# Patient Record
Sex: FEMALE
[De-identification: names, ages, dates, MRNs, and addresses within clinical notes are randomized per-mention and may not be internally consistent; named-entity substitution may affect disease eponyms.]

---

## 2018-06-08 ENCOUNTER — HOSPITAL ENCOUNTER (EMERGENCY)
Dept: HOSPITAL 97 - ER | Age: 83
LOS: 1 days | Discharge: HOME | End: 2018-06-09
Payer: MEDICAID

## 2018-06-08 DIAGNOSIS — I10: ICD-10-CM

## 2018-06-08 DIAGNOSIS — N39.0: ICD-10-CM

## 2018-06-08 DIAGNOSIS — E86.0: Primary | ICD-10-CM

## 2018-06-08 LAB
BUN BLD-MCNC: 34 MG/DL (ref 6–20)
GLUCOSE SERPLBLD-MCNC: 112 MG/DL (ref 65–120)
HCT VFR BLD CALC: 40.8 % (ref 36–45)
LYMPHOCYTES # SPEC AUTO: 2.4 K/UL (ref 0.7–4.9)
MCH RBC QN AUTO: 30.2 PG (ref 27–35)
MCV RBC: 90.3 FL (ref 80–100)
PMV BLD: 9.2 FL (ref 7.6–11.3)
POTASSIUM SERPL-SCNC: 4.2 MEQ/L (ref 3.6–5)
RBC # BLD: 4.52 M/UL (ref 3.86–4.86)

## 2018-06-08 PROCEDURE — 85025 COMPLETE CBC W/AUTO DIFF WBC: CPT

## 2018-06-08 PROCEDURE — 99284 EMERGENCY DEPT VISIT MOD MDM: CPT

## 2018-06-08 PROCEDURE — 84484 ASSAY OF TROPONIN QUANT: CPT

## 2018-06-08 PROCEDURE — 80048 BASIC METABOLIC PNL TOTAL CA: CPT

## 2018-06-08 PROCEDURE — 96374 THER/PROPH/DIAG INJ IV PUSH: CPT

## 2018-06-08 PROCEDURE — 36415 COLL VENOUS BLD VENIPUNCTURE: CPT

## 2018-06-08 PROCEDURE — 96361 HYDRATE IV INFUSION ADD-ON: CPT

## 2018-06-08 PROCEDURE — 93005 ELECTROCARDIOGRAM TRACING: CPT

## 2018-06-08 PROCEDURE — 81003 URINALYSIS AUTO W/O SCOPE: CPT

## 2018-06-09 NOTE — EDPHYS
Physician Documentation                                                                           

 [facility]                                                                

Name: Niya Kumar                                                                             

Age: 88 yrs                                                                                       

Sex: Female                                                                                       

: 1929                                                                                   

MRN: [id number]                                                                                   

Arrival Date: 2018                                                                          

Time: 20:19                                                                                       

Account#: [id number]                                                                            

Bed 24                                                                                            

Private MD:                                                                                       

ED Physician Pierre Goetz                                                                         

HPI:                                                                                              

                                                                                             

21:28 This 88 yrs old  Female presents to ER via Wheelchair with complaints of Blood  rn  

      Pressure Problem.                                                                           

21:28 Reports daughter left her at home, normally doesn't do that, felt anxiety about being   rn  

      at home alone, felt dizzy and lightheaded along with palpitations and anxiety, checked      

      blood pressure, was high, called friend her told her to come to ER. Feels better now        

      that she says feels "safe and protected". No chest pain/syncope/headache/trauma. Takes      

      BP meds but splits her dosage bid instead of taking as prescribed full dose once daily.     

      Plan is to take her home with friend and ultimately live with one of her sons. . Onset:     

      The symptoms/episode began/occurred today. Severity of symptoms: At their worst the         

      symptoms were moderate in the emergency department the symptoms have improved. The          

      patient has experienced similar episodes in the past. The patient has not recently seen     

      a physician.                                                                                

                                                                                                  

Historical:                                                                                       

- Allergies:                                                                                      

20:32 No Known Allergies;                                                                     fc  

- Home Meds:                                                                                      

20:32 lisinopril 20 mg Oral tab 1 tab twice a day [Active]; hydrochlorothiazide 25 mg Oral    fc  

      tab 1 tab once daily [Active];                                                              

- PMHx:                                                                                           

20:32 Hypertension; edema;                                                                    fc  

- PSHx:                                                                                           

20:32 None;                                                                                   fc  

                                                                                                  

- Immunization history:: Last tetanus immunization: unknown.                                      

- Social history:: Smoking status: Patient/guardian denies using tobacco.                         

- Ebola Screening: : Patient negative for fever greater than or equal to 101.5 degrees            

  Fahrenheit, and additional compatible Ebola Virus Disease symptoms Patient denies               

  exposure to infectious person Patient denies travel to an Ebola-affected area in the            

  21 days before illness onset.                                                                   

- Family history:: not pertinent.                                                                 

- Hospitalizations: : No recent hospitalization is reported.                                      

                                                                                                  

                                                                                                  

ROS:                                                                                              

21:28 Constitutional: Negative for fever, chills, and weight loss, Eyes: Negative for injury, rn  

      pain, redness, and discharge, Neck: Negative for injury, pain, and swelling,                

      Cardiovascular: Negative for chest pain, and edema, Respiratory: Negative for shortness     

      of breath, cough, wheezing, and pleuritic chest pain, Abdomen/GI: Negative for              

      abdominal pain, nausea, vomiting, diarrhea, and constipation, Back: Negative for injury     

      and pain, MS/Extremity: Negative for injury and deformity, Skin: Negative for injury,       

      rash, and discoloration, Neuro: Negative for headache, weakness, numbness, tingling,        

      and seizure.                                                                                

                                                                                                  

Exam:                                                                                             

21:28 Constitutional:  This is a well developed, well nourished patient who is awake, alert,  rn  

      and in no acute distress. Head/Face:  Normocephalic, atraumatic. Eyes:  Pupils equal        

      round and reactive to light, extra-ocular motions intact.  Lids and lashes normal.          

      Conjunctiva and sclera are non-icteric and not injected.  Cornea within normal limits.      

      Periorbital areas with no swelling, redness, or edema. Neck:  Trachea midline, no           

      thyromegaly or masses palpated, and no cervical lymphadenopathy.  Supple, full range of     

      motion without nuchal rigidity, or vertebral point tenderness.  No Meningismus.             

      Cardiovascular:  Regular rate and rhythm with a normal S1 and S2.  No gallops, murmurs,     

      or rubs.  Normal PMI, no JVD.  No pulse deficits. Respiratory:  Lungs have equal breath     

      sounds bilaterally, clear to auscultation and percussion.  No rales, rhonchi or wheezes     

      noted.  No increased work of breathing, no retractions or nasal flaring. Abdomen/GI:        

      Soft, non-tender, with normal bowel sounds.  No distension or tympany.  No guarding or      

      rebound.  No evidence of tenderness throughout. Skin:  Warm, dry with normal turgor.        

      Normal color with no rashes, no lesions, and no evidence of cellulitis. MS/ Extremity:      

      Pulses equal, no cyanosis.  Neurovascular intact.  Full, normal range of motion.  Equal     

      circumference. Neuro:  Awake and alert, GCS 15, oriented to person, place, time, and        

      situation.  Cranial nerves II-XII grossly intact.  Motor strength 5/5 in all                

      extremities.  Sensory grossly intact.                                                       

                                                                                                  

Vital Signs:                                                                                      

20:32  / 58; Pulse 64; Resp 18; Temp 99.0(O); Pulse Ox 98% on R/A; Weight 102.51 kg     fc  

      (R); Height 5 ft. 2 in. (157.48 cm) (R); Pain 0/10;                                         

21:10  / 41; Pulse 53; Resp 18; Pulse Ox 100% ;                                         tl3 

21:29  / 41; Pulse 66; Resp 18; Pulse Ox 99% on R/A;                                    tl3 

23:02  / 41; Pulse 51; Resp 18; Pulse Ox 99% ;                                          tl3 

                                                                                             

00:45  / 68; Pulse 64; Resp 18; Pulse Ox 97% ;                                          tl3 

                                                                                             

20:32 Body Mass Index 41.34 (102.51 kg, 157.48 cm)                                            fc  

                                                                                                  

MDM:                                                                                              

                                                                                             

20:43 Patient medically screened.                                                             rn  

                                                                                             

00:14 Differential Diagnosis dehydration, UTI, hypertension, anxiety, depression. Data        rn  

      reviewed: vital signs, nurses notes, lab test result(s), EKG, and as a result, I will       

      discharge patient. Counseling: I had a detailed discussion with the patient and/or          

      guardian regarding: the historical points, exam findings, and any diagnostic results        

      supporting the discharge/admit diagnosis, lab results, the need for outpatient follow       

      up, to return to the emergency department if symptoms worsen or persist or if there are     

      any questions or concerns that arise at home. Response to treatment: the patient's          

      symptoms have mildly improved after treatment, and as a result, I will discharge            

      patient. Special discussion: I discussed with the patient/guardian in detail that at        

      this point there is no indication for admission to the hospital. It is understood,          

      however, that if the symptoms persist or worsen the patient needs to return immediately     

      for re-evaluation.                                                                          

                                                                                                  

                                                                                             

20:54 Order name: CBC with Diff                                                               rn  

                                                                                             

20:54 Order name: Basic Metabolic Panel                                                       rn  

                                                                                             

20:54 Order name: Troponin (emerg Dept Use Only)                                              rn  

                                                                                             

20:54 Order name: CBC with Automated Diff; Complete Time: 22:44                               EDMS

                                                                                             

20:54 Order name: Basic Metabolic Panel; Complete Time: 22:44                                 EDMS

                                                                                             

20:54 Order name: Troponin (Emerg Dept Use Only); Complete Time: 22:44                        EDMS

                                                                                             

20:54 Order name: IV Start; Complete Time: 23:00                                              rn  

                                                                                             

20:54 Order name: EKG - Nurse/Tech; Complete Time: 22:07                                      rn  

                                                                                             

20:54 Order name: Urine Dipstick-Ancillary (obtain specimen); Complete Time: 23:25            rn  

                                                                                             

23:28 Order name: Urine Dipstick--Ancillary (enter results)                                   rg2 

                                                                                                  

Administered Medications:                                                                         

                                                                                             

21:05 Drug: NS 0.9% 500 ml Route: IV; Rate: bolus; Site: right antecubital; Delivery: Primary tl3 

      tubing;                                                                                     

21:56 Follow up: IV Status: Completed infusion; IV Intake: 500ml                              tl3 

                                                                                             

00:44 Drug: Rocephin - (cefTRIAXone) 1 grams Route: IVPB; Infused Over: 5 mins; Site: right   tl3 

      antecubital;                                                                                

00:44 Follow up: IV Status: Completed infusion; IV Intake: 20ml                               tl3 

                                                                                                  

                                                                                                  

Disposition:                                                                                      

18 00:15 Discharged to Home. Impression: Anxiety disorder, unspecified, Dehydration,        

  Urinary tract infection, site not specified.                                                    

- Condition is Stable.                                                                            

- Discharge Instructions: Panic Attacks, Dehydration, Adult, Urinary Tract Infection.             

- Prescriptions for cefpodoxime 100 mg Oral Tablet - take 1 tablet by ORAL route every            

  12 hours for 10 days take with food; 20 tablet.                                                 

- Medication Reconciliation Form, Thank You Letter, Antibiotic Education, Prescription            

  Opioid Use form.                                                                                

- Follow up: Private Physician; When: As needed; Reason: Recheck today's complaints,              

  Re-evaluation by your physician.                                                                

- Problem is new.                                                                                 

- Symptoms have improved.                                                                         

                                                                                                  

                                                                                                  

                                                                                                  

Signatures:                                                                                       

Dispatcher [employer]                           EDEvon Hill, RN                   Pierre Suarez MD MD rn Lowrey, Tammy, RN RN   tl3                                                  

                                                                                                  

Corrections: (The following items were deleted from the chart)                                    

00:46 00:15 2018 00:15 Discharged to Home. Impression: Anxiety disorder, unspecified;   tl3 

      Dehydration; Urinary tract infection, site not specified. Condition is Stable. Forms        

      are Medication Reconciliation Form, Thank You Letter, Antibiotic Education,                 

      Prescription Opioid Use. Follow up: Private Physician; When: As needed; Reason: Recheck     

      today's complaints, Re-evaluation by your physician. Problem is new. Symptoms have          

      improved. rn                                                                                

                                                                                                  

**************************************************************************************************

## 2018-06-09 NOTE — EKG
Test Date:    2018-06-08               Test Time:    22:03:17

Technician:   TL                                     

                                                     

MEASUREMENT RESULTS:                                       

Intervals:                                           

Rate:         59                                     

TN:           184                                    

QRSD:         94                                     

QT:           404                                    

QTc:          399                                    

Axis:                                                

P:            74                                     

TN:           184                                    

QRS:          -10                                    

T:            70                                     

                                                     

INTERPRETIVE STATEMENTS:                                       

                                                     

Sinus bradycardia

Otherwise normal ECG

Compared to ECG 08/12/2008 22:53:46

Sinus rhythm no longer present

Atrial premature complex(es) no longer present

Left ventricular hypertrophy no longer present

Myocardial infarct finding no longer present



Electronically Signed On 06-09-18 07:54:54 CDT by Shane Hurd

## 2018-06-09 NOTE — ER
Nurse's Notes                                                                                     

 [facility]                                                                

Name: Niya Kumar                                                                             

Age: 88 yrs                                                                                       

Sex: Female                                                                                       

: 1929                                                                                   

MRN: [id number]                                                                                   

Arrival Date: 2018                                                                          

Time: 20:19                                                                                       

Account#: [id number]                                                                            

Bed 24                                                                                            

Private MD:                                                                                       

Diagnosis: Anxiety disorder, unspecified;Dehydration;Urinary tract infection, site not specified  

                                                                                                  

Presentation:                                                                                     

                                                                                             

20:22 Presenting complaint: Family friend states that pt lives with her daughter and was left   

      alone today due to other family emergency. Pt called friend to state that she felt          

      nervous, scared and her feeling were hurt. Pt has recently been asked to leave her          

      current living situation due to family issues. Transition of care: patient was not          

      received from another setting of care. Onset of symptoms was 2018. Risk            

      Assessment: Do you want to hurt yourself or someone else? Patient reports no desire to      

      harm self or others. Initial Sepsis Screen: Does the patient meet any 2 criteria? No.       

      Patient's initial sepsis screen is negative. Does the patient have a suspected source       

      of infection? No. Patient's initial sepsis screen is negative. Care prior to arrival:       

      None.                                                                                       

20:22 Method Of Arrival: Wheelchair                                                           fc  

20:22 Acuity: FRANTZ 3                                                                           fc  

                                                                                                  

Historical:                                                                                       

- Allergies:                                                                                      

20:32 No Known Allergies;                                                                     fc  

- Home Meds:                                                                                      

20:32 lisinopril 20 mg Oral tab 1 tab twice a day [Active]; hydrochlorothiazide 25 mg Oral    fc  

      tab 1 tab once daily [Active];                                                              

- PMHx:                                                                                           

20:32 Hypertension; edema;                                                                    fc  

- PSHx:                                                                                           

20:32 None;                                                                                   fc  

                                                                                                  

- Immunization history:: Last tetanus immunization: unknown.                                      

- Social history:: Smoking status: Patient/guardian denies using tobacco.                         

- Ebola Screening: : Patient negative for fever greater than or equal to 101.5 degrees            

  Fahrenheit, and additional compatible Ebola Virus Disease symptoms Patient denies               

  exposure to infectious person Patient denies travel to an Ebola-affected area in the            

  21 days before illness onset.                                                                   

- Family history:: not pertinent.                                                                 

- Hospitalizations: : No recent hospitalization is reported.                                      

                                                                                                  

                                                                                                  

Screenin:10 Abuse screen: Denies threats or abuse. Nutritional screening: No deficits noted.        tl3 

      Tuberculosis screening: No symptoms or risk factors identified. Fall Risk None              

      identified.                                                                                 

                                                                                                  

Assessment:                                                                                       

21:10 General: Appears in no apparent distress. comfortable, obese, well groomed, well        tl3 

      developed, well nourished, Behavior is calm, cooperative, appropriate for age. Pain:        

      Denies pain. Neuro: Level of Consciousness is awake, alert, obeys commands, Oriented to     

      person, place, time, situation, Appropriate for age.                                        

21:29 Cardiovascular: Heart tones S1 S2 present Patient's skin is warm and dry.               tl3 

      Cardiovascular: Reports elevated blood pressure. Respiratory: Airway is patent              

      Respiratory effort is even, unlabored, Respiratory pattern is regular, symmetrical,         

      Breath sounds are clear bilaterally. GI: No signs and/or symptoms were reported             

      involving the gastrointestinal system. : No signs and/or symptoms were reported           

      regarding the genitourinary system. EENT: No signs and/or symptoms were reported            

      regarding the EENT system. Derm: No signs and/or symptoms reported regarding the            

      dermatologic system. Musculoskeletal: No signs and/or symptoms reported regarding the       

      musculoskeletal system.                                                                     

23:02 Reassessment: No changes from previously documented assessment. Patient and/or family   tl3 

      updated on plan of care and expected duration. Pain level reassessed. Patient is alert,     

      oriented x 3, equal unlabored respirations, skin warm/dry/pink. family at bedside.          

                                                                                             

00:45 Reassessment: Patient appears in no apparent distress at this time. No changes from     tl3 

      previously documented assessment. Patient and/or family updated on plan of care and         

      expected duration. Pain level reassessed. Patient is alert, oriented x 3, equal             

      unlabored respirations, skin warm/dry/pink.                                                 

                                                                                                  

Vital Signs:                                                                                      

                                                                                             

20:32  / 58; Pulse 64; Resp 18; Temp 99.0(O); Pulse Ox 98% on R/A; Weight 102.51 kg     fc  

      (R); Height 5 ft. 2 in. (157.48 cm) (R); Pain 0/10;                                         

21:10  / 41; Pulse 53; Resp 18; Pulse Ox 100% ;                                         tl3 

21:29  / 41; Pulse 66; Resp 18; Pulse Ox 99% on R/A;                                    tl3 

23:02  / 41; Pulse 51; Resp 18; Pulse Ox 99% ;                                          tl3 

                                                                                             

00:45  / 68; Pulse 64; Resp 18; Pulse Ox 97% ;                                          tl3 

                                                                                             

20:32 Body Mass Index 41.34 (102.51 kg, 157.48 cm)                                              

                                                                                                  

ED Course:                                                                                        

                                                                                             

20:19 Patient arrived in ED.                                                                  am2 

20:31 Triage completed.                                                                         

20:32 Arm band placed on Patient placed in an exam room, on a stretcher.                      fc  

20:43 Pierre Goetz MD is Attending Physician.                                                rn  

20:43 Dorinda Whelan RN is Primary Nurse.                                                     tl3 

21:10 Patient has correct armband on for positive identification. Placed in gown. Bed in low  tl3 

      position. Call light in reach. Side rails up X 1. Pulse ox on. NIBP on.                     

21:10 No provider procedures requiring assistance completed. Inserted saline lock: 22 gauge   tl3 

      in right antecubital area, using aseptic technique. Blood collected.                        

23:25 CBC with Diff Sent.                                                                     tl3 

23:25 Basic Metabolic Panel Sent.                                                             tl3 

23:25 Troponin (emerg Dept Use Only) Sent.                                                    tl3 

                                                                                             

00:46 IV discontinued, intact, bleeding controlled, No redness/swelling at site. Pressure     tl3 

      dressing applied.                                                                           

                                                                                                  

Administered Medications:                                                                         

                                                                                             

21:05 Drug: NS 0.9% 500 ml Route: IV; Rate: bolus; Site: right antecubital; Delivery: Primary tl3 

      tubing;                                                                                     

21:56 Follow up: IV Status: Completed infusion; IV Intake: 500ml                              tl3 

                                                                                             

00:44 Drug: Rocephin - (cefTRIAXone) 1 grams Route: IVPB; Infused Over: 5 mins; Site: right   tl3 

      antecubital;                                                                                

00:44 Follow up: IV Status: Completed infusion; IV Intake: 20ml                               tl3 

                                                                                                  

                                                                                                  

Intake:                                                                                           

                                                                                             

21:56 IV: 500ml; Total: 500ml.                                                                tl3 

                                                                                             

00:44 IV: 20ml; Total: 520ml.                                                                 tl3 

                                                                                                  

Outcome:                                                                                          

00:15 Discharge ordered by MD.                                                                rn  

00:46 Discharged to home via wheelchair.                                                      tl3 

00:46 Condition: stable                                                                           

00:46 Discharge instructions given to patient, Instructed on discharge instructions, follow       

      up and referral plans. medication usage, Demonstrated understanding of instructions,        

      follow-up care, medications, Prescriptions given X 1.                                       

00:46 Patient left the ED.                                                                    tl3 

                                                                                                  

Signatures:                                                                                       

Evon Houston RN RN                                                      

Pierre Goetz MD MD rn Moreno, Amanda                               2                                                  

Dorinda Whelan RN                       RN   tl3                                                  

                                                                                                  

**************************************************************************************************

## 2018-07-04 ENCOUNTER — HOSPITAL ENCOUNTER (OUTPATIENT)
Dept: HOSPITAL 97 - ER | Age: 83
Setting detail: OBSERVATION
LOS: 3 days | Discharge: HOME | End: 2018-07-07
Attending: HOSPITALIST | Admitting: HOSPITALIST
Payer: MEDICAID

## 2018-07-04 DIAGNOSIS — E87.1: Primary | ICD-10-CM

## 2018-07-04 DIAGNOSIS — E87.5: ICD-10-CM

## 2018-07-04 DIAGNOSIS — N28.9: ICD-10-CM

## 2018-07-04 DIAGNOSIS — M81.0: ICD-10-CM

## 2018-07-04 DIAGNOSIS — E86.0: ICD-10-CM

## 2018-07-04 DIAGNOSIS — E83.42: ICD-10-CM

## 2018-07-04 LAB
ALBUMIN SERPL BCP-MCNC: 3.2 G/DL (ref 3.4–5)
ALP SERPL-CCNC: 92 U/L (ref 45–117)
ALT SERPL W P-5'-P-CCNC: 23 U/L (ref 12–78)
AST SERPL W P-5'-P-CCNC: 23 U/L (ref 15–37)
BUN BLD-MCNC: 29 MG/DL (ref 7–18)
CKMB CREATINE KINASE MB: 2 NG/ML (ref 0.3–3.6)
GLUCOSE SERPLBLD-MCNC: 118 MG/DL (ref 74–106)
HCT VFR BLD CALC: 36.2 % (ref 36–45)
INR BLD: 0.96
LYMPHOCYTES # SPEC AUTO: 1.7 K/UL (ref 0.7–4.9)
MAGNESIUM SERPL-MCNC: 1.6 MG/DL (ref 1.8–2.4)
MCH RBC QN AUTO: 30.6 PG (ref 27–35)
MCV RBC: 89.9 FL (ref 80–100)
NT-PROBNP SERPL-MCNC: 519 PG/ML (ref ?–450)
PMV BLD: 8.5 FL (ref 7.6–11.3)
POTASSIUM SERPL-SCNC: 4.8 MMOL/L (ref 3.5–5.1)
RBC # BLD: 4.03 M/UL (ref 3.86–4.86)

## 2018-07-04 PROCEDURE — 99285 EMERGENCY DEPT VISIT HI MDM: CPT

## 2018-07-04 PROCEDURE — 93005 ELECTROCARDIOGRAM TRACING: CPT

## 2018-07-04 PROCEDURE — 80053 COMPREHEN METABOLIC PANEL: CPT

## 2018-07-04 PROCEDURE — 80076 HEPATIC FUNCTION PANEL: CPT

## 2018-07-04 PROCEDURE — 81003 URINALYSIS AUTO W/O SCOPE: CPT

## 2018-07-04 PROCEDURE — 81015 MICROSCOPIC EXAM OF URINE: CPT

## 2018-07-04 PROCEDURE — 84439 ASSAY OF FREE THYROXINE: CPT

## 2018-07-04 PROCEDURE — 84443 ASSAY THYROID STIM HORMONE: CPT

## 2018-07-04 PROCEDURE — 96361 HYDRATE IV INFUSION ADD-ON: CPT

## 2018-07-04 PROCEDURE — 84300 ASSAY OF URINE SODIUM: CPT

## 2018-07-04 PROCEDURE — 83930 ASSAY OF BLOOD OSMOLALITY: CPT

## 2018-07-04 PROCEDURE — 84100 ASSAY OF PHOSPHORUS: CPT

## 2018-07-04 PROCEDURE — 83935 ASSAY OF URINE OSMOLALITY: CPT

## 2018-07-04 PROCEDURE — 82570 ASSAY OF URINE CREATININE: CPT

## 2018-07-04 PROCEDURE — 82550 ASSAY OF CK (CPK): CPT

## 2018-07-04 PROCEDURE — 84484 ASSAY OF TROPONIN QUANT: CPT

## 2018-07-04 PROCEDURE — 96374 THER/PROPH/DIAG INJ IV PUSH: CPT

## 2018-07-04 PROCEDURE — 71045 X-RAY EXAM CHEST 1 VIEW: CPT

## 2018-07-04 PROCEDURE — 96375 TX/PRO/DX INJ NEW DRUG ADDON: CPT

## 2018-07-04 PROCEDURE — 76770 US EXAM ABDO BACK WALL COMP: CPT

## 2018-07-04 PROCEDURE — 93306 TTE W/DOPPLER COMPLETE: CPT

## 2018-07-04 PROCEDURE — 85610 PROTHROMBIN TIME: CPT

## 2018-07-04 PROCEDURE — 85730 THROMBOPLASTIN TIME PARTIAL: CPT

## 2018-07-04 PROCEDURE — 87088 URINE BACTERIA CULTURE: CPT

## 2018-07-04 PROCEDURE — 82553 CREATINE MB FRACTION: CPT

## 2018-07-04 PROCEDURE — 83880 ASSAY OF NATRIURETIC PEPTIDE: CPT

## 2018-07-04 PROCEDURE — 82533 TOTAL CORTISOL: CPT

## 2018-07-04 PROCEDURE — 96365 THER/PROPH/DIAG IV INF INIT: CPT

## 2018-07-04 PROCEDURE — 87086 URINE CULTURE/COLONY COUNT: CPT

## 2018-07-04 PROCEDURE — 83735 ASSAY OF MAGNESIUM: CPT

## 2018-07-04 PROCEDURE — 74176 CT ABD & PELVIS W/O CONTRAST: CPT

## 2018-07-04 PROCEDURE — 36415 COLL VENOUS BLD VENIPUNCTURE: CPT

## 2018-07-04 PROCEDURE — 80048 BASIC METABOLIC PNL TOTAL CA: CPT

## 2018-07-04 PROCEDURE — 85025 COMPLETE CBC W/AUTO DIFF WBC: CPT

## 2018-07-05 LAB
ALBUMIN SERPL BCP-MCNC: 2.8 G/DL (ref 3.4–5)
ALP SERPL-CCNC: 90 U/L (ref 45–117)
ALT SERPL W P-5'-P-CCNC: 20 U/L (ref 12–78)
AST SERPL W P-5'-P-CCNC: 24 U/L (ref 15–37)
BUN BLD-MCNC: 24 MG/DL (ref 7–18)
BUN BLD-MCNC: 25 MG/DL (ref 7–18)
BUN BLD-MCNC: 26 MG/DL (ref 7–18)
CREAT UR-SCNC: 30 MG/DL (ref 20–320)
GLUCOSE SERPLBLD-MCNC: 116 MG/DL (ref 74–106)
GLUCOSE SERPLBLD-MCNC: 116 MG/DL (ref 74–106)
GLUCOSE SERPLBLD-MCNC: 125 MG/DL (ref 74–106)
HCT VFR BLD CALC: 33.6 % (ref 36–45)
LYMPHOCYTES # SPEC AUTO: 1.6 K/UL (ref 0.7–4.9)
MAGNESIUM SERPL-MCNC: 1.7 MG/DL (ref 1.8–2.4)
MCH RBC QN AUTO: 30.6 PG (ref 27–35)
MCV RBC: 89.1 FL (ref 80–100)
PMV BLD: 8.9 FL (ref 7.6–11.3)
POTASSIUM SERPL-SCNC: 4.7 MMOL/L (ref 3.5–5.1)
POTASSIUM SERPL-SCNC: 5.1 MMOL/L (ref 3.5–5.1)
POTASSIUM SERPL-SCNC: 5.8 MMOL/L (ref 3.5–5.1)
RBC # BLD: 3.77 M/UL (ref 3.86–4.86)
SODIUM UR-SCNC: 70 MMOL/L (ref 27–287)
TSH SERPL DL<=0.05 MIU/L-ACNC: 1.28 UIU/ML (ref 0.36–3.74)
UA COMPLETE W REFLEX CULTURE PNL UR: (no result)
UA DIPSTICK W REFLEX MICRO PNL UR: (no result)

## 2018-07-05 RX ADMIN — SODIUM CHLORIDE SCH MLS: 0.9 INJECTION, SOLUTION INTRAVENOUS at 03:19

## 2018-07-05 RX ADMIN — AMLODIPINE BESYLATE SCH MG: 5 TABLET ORAL at 13:05

## 2018-07-05 RX ADMIN — SODIUM CHLORIDE SCH MLS: 0.9 INJECTION, SOLUTION INTRAVENOUS at 18:06

## 2018-07-05 RX ADMIN — Medication SCH: at 21:00

## 2018-07-05 RX ADMIN — DEXTROSE MONOHYDRATE SCH MLS: 50 INJECTION, SOLUTION INTRAVENOUS at 22:10

## 2018-07-05 RX ADMIN — Medication SCH ML: at 10:24

## 2018-07-05 NOTE — P.PN
Subjective


Date of Service: 07/05/18


Primary Care Provider: None


Chief Complaint: Hyponatremia/AMS


Subjective: Improving (Patient feeling better.)





Physical Examination





- Vital Signs


Temperature: 96.9 F


Blood Pressure: 163/72


Pulse: 50


Respirations: 18


Pulse Ox (%): 96





- Physical Exam


General: Alert, In no apparent distress, Oriented x3, Cooperative


HEENT: Atraumatic, Other (Dry mucous membranes)


Neck: Supple


Respiratory: Clear to auscultation bilaterally, Normal air movement


Cardiovascular: Normal pulses, Regular rate/rhythm


Gastrointestinal: Normal bowel sounds, Soft and benign, Non-distended, No 

tenderness, No masses, No rebound, No guarding


Musculoskeletal: No erythema, No tenderness, No warmth


Integumentary: No tenderness/swelling, No erythema, No warmth, No cyanosis


Neurological: Normal speech, Normal strength at 5/5 x4 extr, Normal tone, 

Normal affect





- Studies


Laboratory Data (last 24 hrs)





07/04/18 23:10: PT 11.3, INR 0.96, APTT 26.1


07/04/18 23:10: WBC 9.9, Hgb 12.3, Hct 36.2, Plt Count 209


07/04/18 23:10: Sodium 123 L, Potassium 4.8, BUN 29 H, Creatinine 1.10, Glucose 

118 H, Magnesium 1.6 L, Total Bilirubin 0.5, AST 23, ALT 23, Alkaline 

Phosphatase 92





Medications List Reviewed: Yes





Assessment & Plan





- Problems (Diagnosis)


(1) Shortness of breath


Current Visit: Yes   Status: Acute   


Plan: 


Etiology unknown.  Chest x-ray unremarkable.  CT head unremarkable.  Will 

monitor closely.  Patient came in with hyponatremia likely from dehydration and 

medication-lisinopril hydrochlorothiazide, which has been discontinued.  Will 

consult Nephrology to further evaluate.  Patient with hypertension.  Will 

provide medication for blood pressure.  Will check echocardiogram.








(2) Hypertension


Current Visit: Yes   Status: Chronic   


Plan: 


Will provide Norvasc 5 mg daily.  Will monitor and adjust appropriately.  

Lisinopril/hydrochlorothiazide has been discontinued due to hyponatremia.


Qualifiers: 


   Hypertension type: essential hypertension   Qualified Code(s): I10 - 

Essential (primary) hypertension   





(3) Hyperkalemia


Current Visit: Yes   Status: Acute   


Plan: 


Improved.  Patient given Kayexalate.  Will monitor closely.








(4) Renal insufficiency


Current Visit: Yes   Status: Acute   


Plan: 


Likely from dehydration and medication.  Will hold lisinopril/

hydrochlorothiazide.  Will monitor renal function.  Will check renal 

ultrasound.  Will consult Nephrology to further assess and treat.








(5) Dehydration


Current Visit: Yes   Status: Acute   


Plan: 


Continue IV fluids.  Will monitor closely.








(6) Hyponatremia


Onset Date: 07/05/18   Current Visit: Yes   Status: Acute   


Plan: 


Continue IV fluids.  Will monitor closely.








(7) Osteoporosis


Current Visit: Yes   Status: Chronic   


Plan: 


Osteoporosis noted on hip x-ray.  No fracture noted.


Qualifiers: 


   Osteoporosis type: age-related   Presence of current pathological fracture: 

unspecified   Qualified Code(s): M81.0 - Age-related osteoporosis without 

current pathological fracture   





(8) Abnormal x-ray


Current Visit: Yes   Status: Chronic   


Plan: 


35 mm density to the right humerus likely infarct.  Will check x-ray.  Patient 

with osteoarthritis and osteoporosis.





Discharge Plan: Home


Plan to discharge in: 24 Hours





- Code Status/Comfort Care


Code Status Assessed: Yes (Patient full code.)


Time Spent Managing Pts Care (In Minutes): 55

## 2018-07-05 NOTE — RAD REPORT
EXAM DESCRIPTION:  RAD - Humerus Right - 7/5/2018 11:51 am

 

CLINICAL HISTORY:  Bone lesion, abnormal chest film

 

COMPARISON:  Chest exam July 4

 

FINDINGS:  No acute fracture is present. Mild degenerative spurring changes are present along the sup
erolateral humeral head. Soft tissue calcifications are present probably from the supraspinatus tendo
n. There degenerative changes to the undersurface of the acromion. No AC joint spurring.

 

In the proximal humerus an approximately 5 x 2 centimeter area of focal density is present. This matc
hes the chest film finding. This is centrally located in the no metaphyseal and greater tuberosity re
gion of the humerus and generally showing infarct and ringing configuration. No endosteal scalloping 
or cortical change. No periosteal reaction. This has a typical appearance for either a bone infarct o
r enchondroma, neither considered clinically significant. There is no dislocation or periosteal react
ion noted. No foreign body or other soft tissue abnormality.

 

IMPRESSION:  Proximal right humerus sclerotic lesion is a bone infarct or enchondroma. In the absence
 of any bone pain symptoms, this is not a clinically significant finding.

 

The patient does have degenerative change at the right shoulder joint unrelated to the bone lesion.

## 2018-07-05 NOTE — ER
Nurse's Notes                                                                                     

 [facility]                                                                

Name: Niya Kumar                                                                             

Age: 88 yrs                                                                                       

Sex: Female                                                                                       

: 1929                                                                                   

MRN: [id number]                                                                                   

Arrival Date: 2018                                                                          

Time: 22:46                                                                                       

Account#: [id number]                                                                            

Bed 6                                                                                             

Private MD:                                                                                       

Diagnosis: Hyponatremia;Hypertensive heart disease                                                

                                                                                                  

Presentation:                                                                                     

                                                                                             

22:54 Presenting complaint: Patient states: I FEEL LIKE I CAN'T BREATHE. Transition of care:  rv  

      patient was not received from another setting of care. Onset of symptoms was 2018 at 19:00. Risk Assessment: Do you want to hurt yourself or someone else? Patient       

      reports no desire to harm self or others. Initial Sepsis Screen: Does the patient meet      

      any 2 criteria? No. Patient's initial sepsis screen is negative. Does the patient have      

      a suspected source of infection? No. Patient's initial sepsis screen is negative. Note      

      PT STATES SHE HAS BEEN DRINKING LOTS OF WATER FOR HER PRIOR UTI, BUT HAS NOT HAD MUCH       

      URINE OUTPUT. Care prior to arrival: None.                                                  

22:54 Method Of Arrival: Wheelchair                                                           rv  

22:54 Acuity: FRANTZ 3                                                                           rv  

                                                                                                  

Triage Assessment:                                                                                

22:56 General: Appears in no apparent distress. comfortable, obese, Behavior is cooperative,  rv  

      appropriate for age, anxious. Pain: Denies pain. Respiratory: Reports shortness of          

      breath Airway is patent Respiratory effort is even, unlabored, Respiratory pattern is       

      regular, symmetrical, Onset: The symptoms/episode began/occurred today, the patient has     

      mild shortness of breath.                                                                   

                                                                                                  

Historical:                                                                                       

- Allergies:                                                                                      

22:56 No Known Allergies;                                                                     rv  

- Home Meds:                                                                                      

22:56 hydrochlorothiazide 25 mg Oral tab 1 tab once daily [Active]; lisinopril 20 mg Oral tab rv  

      1 tab twice a day [Active];                                                                 

- PMHx:                                                                                           

22:56 Hypertension; EDEMA; Anxiety;                                                           rv  

                                                                                                  

- Immunization history:: Adult Immunizations up to date.                                          

- Social history:: Smoking status: Patient/guardian denies using tobacco.                         

- Ebola Screening: : Patient negative for fever greater than or equal to 101.5 degrees            

  Fahrenheit, and additional compatible Ebola Virus Disease symptoms Patient denies               

  exposure to infectious person Patient denies travel to an Ebola-affected area in the            

  21 days before illness onset No symptoms or risks identified at this time.                      

                                                                                                  

                                                                                                  

Screenin:58 Abuse screen: Denies threats or abuse. Denies injuries from another. Nutritional        rv  

      screening: No deficits noted. Tuberculosis screening: No symptoms or risk factors           

      identified. Fall Risk None identified.                                                      

                                                                                                  

Assessment:                                                                                       

22:58 General: SEE TRIAGE NOTE. Cardiovascular: Rhythm is sinus bradycardia. Respiratory:     rv  

      Airway is patent Respiratory effort is even, unlabored, Respiratory pattern is regular,     

      symmetrical, Breath sounds are clear.                                                       

23:50 Reassessment: Patient appears in no apparent distress at this time. Patient and/or      rv  

      family updated on plan of care and expected duration. Pain level reassessed. Patient is     

      alert, oriented x 3, equal unlabored respirations, skin warm/dry/pink. patient lying on     

      bed, comfortable and vital signs are stable.                                                

                                                                                             

00:26 Reassessment: Patient complaint of nausea at this time, Provider notified; Verbal order lp1 

      given to administer Zofran 4mg IV.                                                          

01:25 Reassessment: ALL CURRENT ORDERS COMPLETED, ADMIT IN PROCESS PENDING ADMIT ORDERS.      bp  

                                                                                                  

Vital Signs:                                                                                      

                                                                                             

22:56  / 57; Pulse 57; Resp 16; Temp 98.2; Pulse Ox 100% ; Weight 102.51 kg;            rv  

                                                                                             

00:00  / 51; Pulse 55; Resp 17; Pulse Ox 99% ;                                          bp  

01:00  / 56; Pulse 52; Resp 17; Pulse Ox 98% ;                                          bp  

01:40  / 55; Pulse 50; Resp 15; Pulse Ox 99% on R/A;                                    rv  

                                                                                                  

ED Course:                                                                                        

                                                                                             

22:46 Patient arrived in ED.                                                                  ds1 

22:55 Elton Vital PA is PHCP.                                                                cp  

22:55 Arya Wheeler MD is Attending Physician.                                              cp  

22:55 Triage completed.                                                                       rv  

22:56 Arm band placed on.                                                                     rv  

22:58 Patient has correct armband on for positive identification. Placed in gown. Bed in low  rv  

      position. Call light in reach. Side rails up X2. Adult w/ patient.                          

23:04 Darren Maza, CELSA is Primary Nurse.                                                    bp  

23:13 Inserted saline lock: 20 gauge in right antecubital area, using aseptic technique.      lp1 

      Blood collected.                                                                            

                                                                                             

00:10 X-ray completed. Portable x-ray completed in exam room. Patient tolerated procedure     mh1 

      well.                                                                                       

00:11 XRAY Chest (1 view) In Process Unspecified.                                             EDMS

00:35 Katey Chambers MD is Hospitalizing Provider.                                           cp  

01:25 No provider procedures requiring assistance completed. Patient admitted, IV remains in  bp  

      place.                                                                                      

                                                                                                  

Administered Medications:                                                                         

                                                                                             

23:24 Drug: NS 0.9% 250 ml Route: IV; Rate: bolus; Site: right antecubital;                   rv  

23:49 Follow up: Response: No adverse reaction; IV Status: Completed infusion                 rv  

23:49 Drug: Aspirin Chewable Tablet 324 mg Route: PO;                                         rv  

                                                                                             

00:01 Follow up: Response: No adverse reaction                                                rv  

                                                                                             

23:49 Drug: cloNIDine 0.1 mg Route: PO;                                                       rv  

                                                                                             

00:02 Follow up: Response: No adverse reaction                                                rv  

                                                                                             

23:49 Drug: Ativan 0.5 mg Route: IVP; Site: right antecubital;                                rv  

                                                                                             

00:01 Follow up: Response: No adverse reaction                                                rv  

00:01 Drug: Magnesium Sulfate 1 grams Route: IVPB; Infused Over: 1 hrs; Site: right           rv  

      antecubital;                                                                                

00:27 Drug: Zofran 4 mg Route: IVP; Site: right antecubital;                                  lp1 

                                                                                                  

                                                                                                  

Outcome:                                                                                          

00:37 Decision to Hospitalize by Provider.                                                    cp  

02:10 Admitted to Med/surg accompanied by tech, via wheelchair, room 219, with chart, Report  rv  

      called to  [facility]                                                                          

02:10 Condition: good                                                                             

02:10 Instructed on the need for admit.                                                           

02:11 Patient left the ED.                                                                    rv  

                                                                                                  

Signatures:                                                                                       

Dispatcher [employer]                           EDMS                                                 

Gabby Hughes                               mh1                                                  

Geno Brown                                ds1                                                  

Perla Kim RN                         RN   lp1                                                  

Elton Vital PA                         PA   cp                                                   

Darren Maza RN RN   bp                                                   

Mir Penny RN                    RN   rv                                                   

                                                                                                  

**************************************************************************************************

## 2018-07-05 NOTE — ECHO
HEIGHT: 5 ft 0 in   WEIGHT: 224 lb 3.2 oz   DATE OF STUDY: 07/05/2018   REFER DR: Katey Chambers MD

2-DIMENSIONAL: BOLA WALKERMODE: YES

 DOPPLER: YES

COLOR FLOW: YES



                    TDS:  

PORTABLE: 

 DEFINITY:  

BUBBLE STUDY: 





DIAGNOSIS:  UNCONTROLLED HYPERTENSION



CARDIAC HISTORY:  

CATHERIZATION: NO

SURGERY: NO

PROSTHETIC VALVE: NO

PACEMAKER: NO





MEASUREMENTS (cm)

    DIASTOLIC (NORMALS)                 SYSTOLIC (NORMALS)

IVSd                 0.9 (0.6-1.2)                    LA Diam 3.2 (1.9-4.0)     LVEF       
  55%  

LVIDd               4.4 (3.5-5.7)                        LVIDs      3.1 (2.0-3.5)     %FS  
        29%

LVPWd             1.1 (0.6-1.2)

Ao Diam           2.8 (2.0-3.7)



2 DIMENSIONAL ASSESSMENT:

RIGHT ATRIUM:                   NORMAL

LEFT ATRIUM:       NORMAL



RIGHT VENTRICLE:            NORMAL

LEFT VENTRICLE: NORMAL



TRICUSPID VALVE:             NORMAL

MITRAL VALVE:     NORMAL



PULMONIC VALVE:             NORMAL

AORTIC VALVE:     NORMAL



PERICARDIAL EFFUSION: NONE

AORTIC ROOT:      NORMAL





LEFT VENTRICULAR WALL MOTION:     NORMAL



DOPPLER/COLOR FLOW:     NORMAL



COMMENTS:      NORMAL TWO DIMNESIONAL ECHOCARDIOGRAM WITH DOPPLER



TECHNOLOGIST:   BHARATH MOORE

## 2018-07-05 NOTE — EDPHYS
Physician Documentation                                                                           

 [facility]                                                                

Name: Niya Kumar                                                                             

Age: 88 yrs                                                                                       

Sex: Female                                                                                       

: 1929                                                                                   

MRN: [id number]                                                                                   

Arrival Date: 2018                                                                          

Time: 22:46                                                                                       

Account#: [id number]                                                                            

Bed 6                                                                                             

Private MD:                                                                                       

ED Physician Arya Wheeler                                                                       

HPI:                                                                                              

                                                                                             

23:30 This 88 yrs old  Female presents to ER via Wheelchair with complaints of        cp  

      Breathing Difficulty.                                                                       

23:30 The patient has shortness of breath tightness in neck. Onset: The symptoms/episode      cp  

      began/occurred tonight about .                                                          

23:30 Duration: The symptoms are continuous. Associated signs and symptoms: Pertinent         cp  

      negatives: chest pain, non-productive cough, productive cough, diaphoresis, fever,          

      vomiting, abdominal pain. Severity of symptoms: in the emergency department the             

      symptoms are unchanged.                                                                     

23:30 The patient has experienced a previous episode, last month, reports was diagnosed with  cp  

      anxiety.                                                                                    

                                                                                                  

Historical:                                                                                       

- Allergies:                                                                                      

22:56 No Known Allergies;                                                                     rv  

- Home Meds:                                                                                      

22:56 hydrochlorothiazide 25 mg Oral tab 1 tab once daily [Active]; lisinopril 20 mg Oral tab rv  

      1 tab twice a day [Active];                                                                 

- PMHx:                                                                                           

22:56 Hypertension; EDEMA; Anxiety;                                                           rv  

                                                                                                  

- Immunization history:: Adult Immunizations up to date.                                          

- Social history:: Smoking status: Patient/guardian denies using tobacco.                         

- Ebola Screening: : Patient negative for fever greater than or equal to 101.5 degrees            

  Fahrenheit, and additional compatible Ebola Virus Disease symptoms Patient denies               

  exposure to infectious person Patient denies travel to an Ebola-affected area in the            

  21 days before illness onset No symptoms or risks identified at this time.                      

                                                                                                  

                                                                                                  

ROS:                                                                                              

23:35 Cardiovascular: Negative for chest pain, edema, palpitations.                           cp  

23:35 Eyes: Negative for injury, pain, redness, and discharge.                                cp  

23:35 Constitutional: Negative for body aches, chills, fever, poor PO intake.                     

23:35 ENT: Positive for tightness in throat, Negative for drainage from ear(s), ear pain,         

      sore throat, difficulty swallowing, difficulty handling secretions, hoarseness.             

23:35 Respiratory: Negative for cough, wheezing.                                                  

23:35 Abdomen/GI: Negative for abdominal pain, nausea, vomiting, and diarrhea, constipation,      

      black/tarry stool, rectal bleeding.                                                         

23:35 Back: Negative for pain at rest, pain with movement, radiated pain.                         

23:35 : Negative for urinary symptoms.                                                          

23:35 MS/extremity: Negative for injury or acute deformity, decreased range of motion, pain,      

      paresthesias, swelling, tenderness, warmth.                                                 

23:35 Skin: Negative for cellulitis, rash.                                                        

23:35 Neuro: Negative for altered mental status, dizziness, headache, syncope, near syncope,      

      weakness.                                                                                   

23:35 All other systems are negative.                                                             

                                                                                                  

Exam:                                                                                             

23:20 ECG was reviewed by the Attending Physician.                                            cp  

23:42 Constitutional: The patient appears in no acute distress, alert, awake,                 cp  

      non-diaphoretic, non-toxic, well developed, well nourished.                                 

23:42 Head/Face:  Normocephalic, atraumatic. Eyes:  Pupils equal round and reactive to light, cp  

      extra-ocular motions intact.  Lids and lashes normal.  Conjunctiva and sclera are           

      non-icteric and not injected.  Cornea within normal limits.  Periorbital areas with no      

      swelling, redness, or edema. ENT:  Nares patent. No nasal discharge, no septal              

      abnormalities noted.  Tympanic membranes are normal and external auditory canals are        

      clear.  Oropharynx with no redness, swelling, or masses, exudates, or evidence of           

      obstruction, uvula midline.  Mucous membranes moist. Neck:  Trachea midline, no             

      thyromegaly or masses palpated, and no cervical lymphadenopathy.  Supple, full range of     

      motion without nuchal rigidity, or vertebral point tenderness.  No Meningismus.             

      Chest/axilla:  Normal chest wall appearance and motion.  Nontender with no deformity.       

      No lesions are appreciated.                                                                 

23:42 Cardiovascular: Rate: bradycardic, Rhythm: regular, Pulses: Pulses are 2+ in right          

      radial artery and left radial artery. Edema: is not appreciated, JVD: is not                

      appreciated.                                                                                

23:42 Respiratory: the patient does not display signs of respiratory distress,  Respirations:     

      normal, no use of accessory muscles, no retractions, no shallow respirations, no            

      splinting, no tachypnea, labored breathing, is not present, Breath sounds: are clear        

      throughout, no decreased breath sounds, no stridor, no wheezing.                            

23:42 Abdomen/GI: Inspection: obese Bowel sounds: active, all quadrants, Palpation: abdomen       

      is soft and non-tender, in all quadrants, rebound tenderness, is not appreciated,           

      voluntary guarding, is not appreciated, involuntary guarding, is not appreciated.           

23:42 Back: pain, is absent, ROM is normal.                                                       

23:42 Skin: cellulitis, is not appreciated, no rash present.                                      

23:42 Neuro: Orientation: to person, place \T\ time. Mentation: lucid, able to follow commands,   

      Cerebellar function: is grossly normal, Motor: moves all fours, strength is normal,         

      Sensation: is normal.                                                                       

                                                                                                  

Vital Signs:                                                                                      

22:56  / 57; Pulse 57; Resp 16; Temp 98.2; Pulse Ox 100% ; Weight 102.51 kg;            rv  

                                                                                             

00:00  / 51; Pulse 55; Resp 17; Pulse Ox 99% ;                                          bp  

01:00  / 56; Pulse 52; Resp 17; Pulse Ox 98% ;                                          bp  

01:40  / 55; Pulse 50; Resp 15; Pulse Ox 99% on R/A;                                    rv  

                                                                                                  

MDM:                                                                                              

                                                                                             

22:55 Patient medically screened.                                                             cp  

23:30 Differential diagnosis: Anxiety Reaction asthma, CHF exacerbation, Chronic Obstructive  cp  

      Pulmonary Disease Myocardial Infarction pneumonia, Pneumothorax pulmonary edema,            

      Pulmonary Embolism reactive airway disease, Unstable Angina.                                

                                                                                             

00:30 Counseling: I had a detailed discussion with the patient and/or guardian regarding: the cp  

      historical points, exam findings, and any diagnostic results supporting the                 

      discharge/admit diagnosis, the presence of at least one elevated blood pressure reading     

      (>120/80) during this emergency department visit, lab results, the need for further         

      work-up and treatment in the hospital.                                                      

00:30 Response to treatment: the patient's symptoms have mildly improved after treatment.       

      Physician consultation: Katey Chambers MD was called at 00:20, was contacted at 00:20,       

      regarding admission, to the telemetry unit. patient's condition.                            

00:32 Data reviewed: vital signs, nurses notes, lab test result(s), EKG, radiologic studies,  cp  

      plain films.                                                                                

                                                                                                  

                                                                                             

23:02 Order name: Basic Metabolic Panel; Complete Time: 23:53                                 cp  

                                                                                             

23:54 Interpretation: Normal except: ; CL 94; CO2 19; GLUC 118; BUN 29; GFR 47.         cp  

                                                                                             

23:02 Order name: CBC with Diff; Complete Time: 23:53                                         cp  

                                                                                             

23:54 Interpretation: Normal except: GOLD% 74.0.                                               cp  

07/04                                                                                             

23:02 Order name: Ckmb; Complete Time: 23:53                                                  cp  

07/04                                                                                             

23:02 Order name: CPK; Complete Time: 23:53                                                   cp  

07/04                                                                                             

23:02 Order name: LFT's; Complete Time: 23:53                                                 cp  

07/04                                                                                             

23:54 Interpretation: Normal except: ALB 3.2; A/G 0.9.                                        cp  

07/04                                                                                             

23:02 Order name: Magnesium; Complete Time: 23:53                                             cp  

07/                                                                                             

23:02 Order name: NT PRO-BNP; Complete Time: 23:53                                            cp  

07/04                                                                                             

23:02 Order name: PT-INR; Complete Time: 23:53                                                cp  

07/                                                                                             

23:02 Order name: Ptt, Activated; Complete Time: 23:53                                        cp  

/                                                                                             

23:02 Order name: Troponin (emerg Dept Use Only); Complete Time: 23:53                        cp  

/04                                                                                             

23:54 Interpretation: TROPED 0.04; Reviewed.                                                  cp  

07/                                                                                             

23:02 Order name: XRAY Chest (1 view)                                                         cp  

                                                                                             

23:02 Order name: EKG; Complete Time: 23:03                                                   cp  

07/04                                                                                             

23:02 Order name: Cardiac monitoring; Complete Time: 23:05                                    cp  

07/04                                                                                             

23:02 Order name: EKG - Nurse/Tech; Complete Time: 23:16                                      cp  

/                                                                                             

23:02 Order name: IV Saline Lock; Complete Time: 23:15                                        cp  

/                                                                                             

23:02 Order name: Labs collected and sent; Complete Time: 23:16                               cp  

/                                                                                             

23:02 Order name: O2 Per Protocol; Complete Time: 23:05                                       cp  

/                                                                                             

23:02 Order name: O2 Sat Monitoring; Complete Time: 23:05                                     cp  

                                                                                                  

EC/04                                                                                             

23:20 Rate is 52 beats/min. Rhythm is regular. ND interval is normal. QRS interval is normal. cp  

      QT interval is normal. T waves are Inverted in lead aVL. No ST changes noted.               

      Interpreted by me. Reviewed by me.                                                          

                                                                                                  

Administered Medications:                                                                         

23:24 Drug: NS 0.9% 250 ml Route: IV; Rate: bolus; Site: right antecubital;                   rv  

23:49 Follow up: Response: No adverse reaction; IV Status: Completed infusion                 rv  

23:49 Drug: Aspirin Chewable Tablet 324 mg Route: PO;                                         rv  

                                                                                             

00:01 Follow up: Response: No adverse reaction                                                rv  

                                                                                             

23:49 Drug: cloNIDine 0.1 mg Route: PO;                                                       rv  

                                                                                             

00:02 Follow up: Response: No adverse reaction                                                rv  

                                                                                             

23:49 Drug: Ativan 0.5 mg Route: IVP; Site: right antecubital;                                rv  

                                                                                             

00:01 Follow up: Response: No adverse reaction                                                rv  

00:01 Drug: Magnesium Sulfate 1 grams Route: IVPB; Infused Over: 1 hrs; Site: right           rv  

      antecubital;                                                                                

00:27 Drug: Zofran 4 mg Route: IVP; Site: right antecubital;                                  lp1 

                                                                                                  

                                                                                                  

Disposition:                                                                                      

03:45 Co-signature as Attending Physician, Arya Wheeler MD.                                    

                                                                                                  

Disposition:                                                                                      

18 00:37 Hospitalization ordered by Katey Chambers for Observation. Preliminary             

  diagnosis are Hyponatremia, Hypertensive heart disease.                                         

- Bed requested for Telemetry/MedSurg (observation).                                              

- Status is Observation.                                                                      rv  

- Condition is Stable.                                                                            

- Problem is new.                                                                                 

- Symptoms have improved.                                                                         

UTI on Admission? No                                                                              

                                                                                                  

                                                                                                  

                                                                                                  

Signatures:                                                                                       

Dispatcher [employer]                           EDMS                                                 

Gabby Granados RN                        RN                                                      

Perla Kim RN                         RN   lp1                                                  

Elton Vital PA PA cp Starr, Gregory, MD MD                                                      

Mir Penny RN                    RN   rv                                                   

                                                                                                  

Corrections: (The following items were deleted from the chart)                                    

00:43 00:37 Hospitalization Ordered by Katey Chambers MD for Observation. Preliminary          mw  

      diagnosis is Hyponatremia; Hypertensive heart disease. Bed requested for                    

      Telemetry/MedSurg (observation). Status is Observation. Condition is Stable. Problem is     

      new. Symptoms have improved. UTI on Admission? No. cp                                       

00:43 00:43 2018 00:37 Hospitalization Ordered by Katey Chambers MD for Observation.     mw  

      Preliminary diagnosis is Hyponatremia; Hypertensive heart disease. Bed requested for        

      Telemetry/MedSurg (observation). Status is Observation. Condition is Stable. Problem is     

      new. Symptoms have improved. UTI on Admission? No. mw                                       

02:11 00:43 2018 00:37 Hospitalization Ordered by Katey Chambers MD for Observation.     rv  

      Preliminary diagnosis is Hyponatremia; Hypertensive heart disease. Bed requested for        

      Telemetry/MedSurg (observation). Status is Observation. Condition is Stable. Problem is     

      new. Symptoms have improved. UTI on Admission? No. mw                                       

                                                                                                  

**************************************************************************************************

## 2018-07-05 NOTE — RAD REPORT
EXAM DESCRIPTION:  Landy Single View7/5/2018 12:11 am

 

CLINICAL HISTORY:  Chest pain

 

COMPARISON:  none

 

FINDINGS:   The lungs appear clear of acute infiltrate. The heart is mildly to moderately enlarged.

 

35 millimeter calcific density within the proximal right humerus probably represents an infarct

 

IMPRESSION:   No acute abnormalities displayed

## 2018-07-05 NOTE — P.HP
Certification for Inpatient


Patient admitted to: Observation


With expected LOS: <2 Midnights


Patient will require the following post-hospital care: None


Practitioner: I am a practitioner with admitting privileges, knowledge of 

patient current condition, hospital course, and medical plan of care.


Services: Services provided to patient in accordance with Admission 

requirements found in Title 42 Section 412.3 of the Code of Federal Regulations





Patient History


Date of Service: 07/05/18


Reason for admission: Hyponatremia/AMS


History of Present Illness: 





Patient is an 89yo who is mainly Croatian speaking, who was admitted to the 

hospital with shortness of breath and confusion.  Patient has been feeling weak 

with shortness of breath for the last few days.  Patient's symptoms were 

worsening and she fell like she could not breathe.  She apparently has been 

drinking a lot of fluids that she has been feeling thirsty all the time.  She 

also has felt somewhat confused.  She came into the hospital for further 

evaluation.  In the emergency room, her workup revealed significant 

hyponatremia.  She also had an elevated BNP.  She will be admitted to the 

hospital and worked up for her shortness of breath and her hyponatremia which 

is most likely related to her fluid intake as well as her newly started 

hydrochlorothiazide.  Will reassess her labs in the morning and proceed from 

there.


Allergies





No Known Allergies Allergy (Verified 07/05/18 02:40)


 





Home Medications: 








Lisinopril/Hydrochlorothiazide [Lisinopril-Hctz 20-12.5 mg Tab] 1 tab PO BID 07/ 05/18 








- Past Medical/Surgical History


Has patient received pneumonia vaccine in the past: No


Diabetic: No


-: HTN


Past Surgical History: Patient denies surgical history





- Family History


  ** Father


Family History: Reviewed- Non-Contributory





- Social History


Smoking Status: Never smoker


Alcohol use: No


CD- Drugs: No


Caffeine use: No


Place of Residence: Home





Review of Systems


10-point ROS is otherwise unremarkable





Physical Examination





- Vital Signs


Temperature: 97.2 F


Blood Pressure: 137/65


Pulse: 53


Respirations: 20


Pulse Ox (%): 98





- Physical Exam


General: Alert, In no apparent distress, Oriented x2


HEENT: Atraumatic, PERRLA, Mucous membr. moist/pink, EOMI, Sclerae nonicteric


Neck: Supple, 2+ carotid pulse no bruit, No LAD, Without JVD or thyroid 

abnormality


Respiratory: Clear to auscultation bilaterally, Normal air movement


Cardiovascular: Regular rate/rhythm, Normal S1 S2, No murmurs


Gastrointestinal: Normal bowel sounds, Soft and benign, Non-distended, No 

tenderness


Musculoskeletal: No clubbing, No swelling, No tenderness


Integumentary: No rashes


Neurological: Normal speech, Normal tone, Sensation intact, Cranial nerves 3-12 

intact, Normal affect, Abnormal gait, Abnormal strength


Lymphatics: No axilla or inguinal lymphadenopathy





- Studies


Laboratory Data (last 24 hrs)





07/04/18 23:10: PT 11.3, INR 0.96, APTT 26.1


07/04/18 23:10: WBC 9.9, Hgb 12.3, Hct 36.2, Plt Count 209


07/04/18 23:10: Sodium 123 L, Potassium 4.8, BUN 29 H, Creatinine 1.10, Glucose 

118 H, Magnesium 1.6 L, Total Bilirubin 0.5, AST 23, ALT 23, Alkaline 

Phosphatase 92








Assessment & Plan





- Problems (Diagnosis)


(1) Altered mental status


Current Visit: Yes   Status: Acute   





(2) Hyponatremia


Current Visit: Yes   Status: Acute   





(3) Left leg pain


Current Visit: Yes   Status: Acute   





(4) Dyspnea


Current Visit: Yes   Status: Acute   





- Plan





Plan:


1. Repeat sodium labs in the morning


2. Normal saline at 70 cc an hr


3. Echocardiogram


4. Dc hydrochlorothiazide


5. Left leg x-ray


6. Check thyroid studies and cortisol level


7. Wait for official chest x-ray reading


8. DVT prophylaxis


Discharge Plan: Home


Plan to discharge in: 48 Hours





- Advance Directives


Does patient have a Living Will: No


Does patient have a Durable POA for Healthcare: No





- Code Status/Comfort Care


Code Status Assessed: Yes


Code Status: Full Code


Critical Care: No


Time Spent Managing PTS Care (In Minutes): 50

## 2018-07-05 NOTE — RAD REPORT
EXAM DESCRIPTION:  RAD - Hip Left 2 View - 7/5/2018 9:23 am

 

CLINICAL HISTORY:   Left hip pain

 

FINDINGS:   No fracture or dislocation is seen.

 

The bones are osteoporotic. Mild osteoarthritis is seen. If patient has clinical symptoms to suggest 
an occult fracture then MRI would be recommend

## 2018-07-05 NOTE — CON
Date of Consultation:  07/05/2018



Chief Complaint:  Hyponatremia, hypoosmolar.



History Of Present Illness:  The patient came to the hospital because of 
shortness of breath and generalized weakness.  She was found to have moderately 
severe to severe hyponatremia.  Sodium level was 124 and glucose 125.  Primarily
, lab work was obtained on July 3rd in  and sodium level was 123, potassium 
4.8, chloride 94, CO2 19, BUN 29, creatinine 1.1, glucose 118.  Subsequently, 
osmolality was done on July 5th and serum osmolality was 260.  Sodium was 124, 
potassium was up to 5.8, chloride 95, CO2 22, BUN 26, creatinine 1.0, 
phosphorus 2.7, and calcium 8.4. 

 Nephrology consultation was requested today for hyponatremia.  Blood work 
obtained after hyperkalemia was treated with Kayexalate, lab work  showed 
sodium 124, potassium 5.1, chloride 96, CO2 23, calcium was 8.6.  The patient 
was started on normal saline for treatment of hyponatremia.  Review of home 
medications showed that the patient was taking HCTZ, which likely was a culprit 
for hyponatremia.  Renal ultrasound showed mild bilateral hydronephrosis, 
suspected increased renal echogenicity consistent with parenchymal disease and 
bilateral renal cysts.  

Prior to this admission, the patient was taking ACE inhibitor and she denies 
nonsteroidal anti-inflammatory medication.  Cardiac echo showed ejection 
fraction of 55%.  Normal echocardiogram with Doppler. 



The patient has history of hypertension.  The patient came to the hospital 
because she was complaining of difficulty with ambulation, shortness of breath, 
and some pressure like chest discomfort.  The patient was found to have 
moderately severe hyponatremia as well as hyperkalemia, elevated BNP.



Review of Systems:

Constitutional:  Denies fever or chills.  

Eyes:  Denies vision changes. 

Ears, Nose, Mouth, and Throat:  Denies sore throat or earache.  

Respiratory:  Complaining of shortness of breath and pressure like chest 
discomfort.  

GI:  Denies nausea or vomiting.  

:  Denies dysuria or hematuria. 

Musculoskeletal:  Denies muscle aches or joint swelling. 



All other system reviewed and all are negative.



Past Medical History:  Obesity, hypertension.



Family History:  No kidney disease.



Social History:  Denies tobacco, alcohol, or illicit drugs.



Physical Examination:

Vital Signs:  Blood pressure is 137/65, temperature 97.2, heart rate 53, 
respiratory rate 20, SpO2 98%.  

General:  The patient is not in acute distress 

Eyes:  Anicteric sclerae.  EOMI. 

Ear, Nose, Mouth, and Throat:  Oral mucosa moist.  No pallor. 

Neck:  Supple.  No JVD.  No bruits. 

Lungs:  Clear to auscultation bilaterally.  No wheezing.  No rhonchi 

Heart:  S1, S2.  No pericardial friction rub. 

Abdomen:  Soft, obese, nontender. 

Extremities:  No edema.  No clubbing.  No cyanosis.



Laboratory Data:  Sodium 123, potassium 4.8, BUN 29, creatinine 1.1, glucose 118
, magnesium 1.6.



Impression And Plan:  

1.   Hyponatremia, hypoosmolar, secondary to HCTZ.  The patient was taken off 
HCTZ, started on normal saline.  Monitor electrolytes.  Adjust fluid rate and 
chronicity for gradual correction of hyponatremia.

2.   Hyperkalemia.  The patient will need further workup to rule out 
hydronephrosis.  The patient will have a CT scan of the abdomen and pelvis 
without contrast per renal stone protocol.

3.   Continue low-potassium diet.  Continue normal saline for hydration.  
Adjust fluids as needed.

4.   Dyspnea.  Workup pending with Cardiology.  There is no evidence of 
systolic dysfunction.

5.   Hypertension.  The patient was taken off ACE inhibitor because of 

hyperkalemia.  Monitor electrolytes.  

6.   Hypomagnesemia.  Magnesium replacement as needed.





ALENA/ZECHARIAH

DD:  07/05/2018 20:23:05   Voice ID:  [id number]

DT:  07/05/2018 22:15:49   Report ID:  [id number]

MTDD

## 2018-07-05 NOTE — EKG
Test Date:    2018-07-04               Test Time:    23:15:19

Technician:                                       

                                                     

MEASUREMENT RESULTS:                                       

Intervals:                                           

Rate:         52                                     

MO:           164                                    

QRSD:         96                                     

QT:           410                                    

QTc:          381                                    

Axis:                                                

P:            55                                     

MO:           164                                    

QRS:          10                                     

T:            66                                     

                                                     

INTERPRETIVE STATEMENTS:                                       

                                                     

Sinus bradycardia

Otherwise normal ECG

Compared to ECG 06/08/2018 22:03:17

No significant changes



Electronically Signed On 07-05-18 06:29:04 CDT by Bruce Wood

## 2018-07-05 NOTE — RAD REPORT
EXAM DESCRIPTION:  US - Renal Ultrasound-Complete - 7/5/2018 12:08 pm

 

CLINICAL HISTORY:  . Renal

 

COMPARISON:  None.

 

FINDINGS:  The right kidney measures 11 centimeters cm with a mildly increased echotexture. Small rig
ht renal cysts are noted.

 

The left kidney measures cm with an increased echotexture. Several parapelvic and cortical renal cyst
s are seen measuring up to a 2.3 centimeters.

 

Mild bilateral hydronephrosis is suspected.

 

IMPRESSION:  Increased renal echotexture consistent with parenchymal disease

 

Bilateral renal cysts

 

Mild bilateral hydronephrosis is suspected

## 2018-07-06 LAB
ALBUMIN SERPL BCP-MCNC: 2.7 G/DL (ref 3.4–5)
ALP SERPL-CCNC: 84 U/L (ref 45–117)
ALT SERPL W P-5'-P-CCNC: 22 U/L (ref 12–78)
AST SERPL W P-5'-P-CCNC: 26 U/L (ref 15–37)
BUN BLD-MCNC: 21 MG/DL (ref 7–18)
BUN BLD-MCNC: 22 MG/DL (ref 7–18)
BUN BLD-MCNC: 23 MG/DL (ref 7–18)
BUN BLD-MCNC: 24 MG/DL (ref 7–18)
GLUCOSE SERPLBLD-MCNC: 104 MG/DL (ref 74–106)
GLUCOSE SERPLBLD-MCNC: 110 MG/DL (ref 74–106)
GLUCOSE SERPLBLD-MCNC: 145 MG/DL (ref 74–106)
GLUCOSE SERPLBLD-MCNC: 92 MG/DL (ref 74–106)
HCT VFR BLD CALC: 34.2 % (ref 36–45)
LYMPHOCYTES # SPEC AUTO: 1.3 K/UL (ref 0.7–4.9)
MAGNESIUM SERPL-MCNC: 1.8 MG/DL (ref 1.8–2.4)
MCH RBC QN AUTO: 31.7 PG (ref 27–35)
MCV RBC: 89.7 FL (ref 80–100)
PMV BLD: 9.8 FL (ref 7.6–11.3)
POTASSIUM SERPL-SCNC: 4.3 MMOL/L (ref 3.5–5.1)
POTASSIUM SERPL-SCNC: 4.5 MMOL/L (ref 3.5–5.1)
POTASSIUM SERPL-SCNC: 4.8 MMOL/L (ref 3.5–5.1)
POTASSIUM SERPL-SCNC: 4.9 MMOL/L (ref 3.5–5.1)
RBC # BLD: 3.81 M/UL (ref 3.86–4.86)

## 2018-07-06 RX ADMIN — Medication SCH: at 08:50

## 2018-07-06 RX ADMIN — DEXTROSE MONOHYDRATE SCH MLS: 50 INJECTION, SOLUTION INTRAVENOUS at 08:50

## 2018-07-06 RX ADMIN — DEXTROSE MONOHYDRATE SCH MLS: 50 INJECTION, SOLUTION INTRAVENOUS at 16:30

## 2018-07-06 RX ADMIN — Medication SCH ML: at 21:02

## 2018-07-06 RX ADMIN — AMLODIPINE BESYLATE SCH MG: 5 TABLET ORAL at 08:50

## 2018-07-06 NOTE — P.DS
Admission Date: 07/05/18


Discharge Date: 07/06/18


Primary Care Provider: [place] Ariane


Disposition: ROUTINE DISCHARGE


Discharge Condition: GOOD


Reason for Admission: Hyponatremia/AMS


Consultations: 





Nephrology-Dr. Arias


Procedures: 





Echocardiogram:  Ejection fraction 55% otherwise unremarkable.





Renal ultrasound:  Medical renal disease noted. Bilateral renal cysts noted. 

Mild bilateral hydronephrosis noted.





CT scan abdomen pelvis:





Humerus x-ray:  Proximal right humerus sclerotic lesion noted. Likely benign.  

Patient with degenerative changes to the right shoulder.





- Problems


(1) Shortness of breath


Current Visit: Yes   Status: Acute   





(2) Hypertension


Current Visit: Yes   Status: Chronic   


Qualifiers: 


   Hypertension type: essential hypertension   Qualified Code(s): I10 - 

Essential (primary) hypertension   





(3) Hyperkalemia


Current Visit: Yes   Status: Acute   





(4) Renal insufficiency


Current Visit: Yes   Status: Acute   





(5) Dehydration


Current Visit: Yes   Status: Acute   





(6) Hyponatremia


Onset Date: 07/05/18   Current Visit: Yes   Status: Acute   





(7) Osteoporosis


Current Visit: Yes   Status: Chronic   


Qualifiers: 


   Osteoporosis type: age-related   Presence of current pathological fracture: 

unspecified   Qualified Code(s): M81.0 - Age-related osteoporosis without 

current pathological fracture   





(8) Abnormal x-ray


Current Visit: Yes   Status: Chronic   





(9) Hydronephrosis


Current Visit: Yes   Status: Suspected   


Qualifiers: 


   Hydronephrosis type: unspecified   Qualified Code(s): N13.30 - Unspecified 

hydronephrosis   





(10) Chronic renal disease


Current Visit: Yes   Status: Chronic   


Qualifiers: 


   Chronic kidney disease stage: stage 2 (mild)   Qualified Code(s): N18.2 - 

Chronic kidney disease, stage 2 (mild)   





(11) Renal cyst


Current Visit: Yes   Status: Chronic   





(12) Degenerative arthritis of shoulder region


Current Visit: Yes   Status: Chronic   


Qualifiers: 


   Osteoarthritis type: unspecified   Laterality: right   Qualified Code(s): 

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   


Brief History of Present Illness: 





88-year-old  female presented emergency room with shortness of breath 

and fatigue.  Patient was found to be hyponatremic and hyperkalemic.  Patient 

on diuretic therapy-hydrochlorothiazide and ACE-inhibitor.  Patient was 

admitted for further evaluation.


Hospital Course: 





During the course of her stay her condition improved.  For her hyponatremia the 

patient was given IV fluids.  Nephrology was consulted.  Hyponatremia likely 

related to hydrochlorothiazide.  This has been discontinued along with ACE-

inhibitor.  For her hyperkalemia, patient given Kayexalate.  At discharge BMP 

within normal range.  Echocardiogram showed normal ejection fraction of 55%.  

Renal ultrasound showed parenchymal disease likely underlying chronic renal 

disease renal cysts noted. Mild hydronephrosis was noted. CT of the abdomen 

pelvis was done to further assess the hydronephrosis.  At discharge 

hydrochlorothiazide will be discontinued.  Patient will need to follow up with 

nephrology as an outpatient to further monitor and address.





Patient has hypertension.  Medications have been adjusted.  Patient will no 

longer take hydrochlorothiazide and lisinopril.  Patient will continue with 

medication-Norvasc 5 mg daily.  Recommendation is to maintain blood pressures 

less 150/80.  Further adjustment can be done by her PCP.





X-ray showed proximal right humerus sclerotic lesion.  This is likely benign.  

Degenerative changes noted to the right shoulder region as well.  

Recommendation for the patient follow up with orthopedics as outpatient to 

further address.  Patient may take Tylenol as needed for pain. Recommendation 

on no further use of nonsteroidal anti-inflammatories due to her chronic renal 

disease.





Patient has underlying chronic renal disease. Recommendations for the patient 

follow up with nephrology as an outpatient to further monitor.  Recommendation 

on no further use of nonsteroidal anti-inflammatories.  Future medications will 

need to be renally dosed.








Vital Signs/Physical Exam: 














Temp Pulse Resp BP Pulse Ox


 


 97.6 F   53   16   145/67 H  97 


 


 07/06/18 08:00  07/06/18 08:50  07/06/18 08:00  07/06/18 08:50  07/06/18 08:00








General: Alert, In no apparent distress, Oriented x3, Cooperative


HEENT: Atraumatic


Neck: Supple


Respiratory: Clear to auscultation bilaterally, Normal air movement


Cardiovascular: Normal pulses, Regular rate/rhythm


Gastrointestinal: Normal bowel sounds, Soft and benign, Non-distended, No 

tenderness, No masses, No rebound, No guarding


Musculoskeletal: No erythema, No tenderness, No warmth


Integumentary: No tenderness/swelling, No erythema, No warmth, No cyanosis


Neurological: Normal speech, Normal strength at 5/5 x4 extr, Normal tone, 

Normal affect


Laboratory Data at Discharge: 














WBC  7.0 K/uL (4.3-10.9)  D 07/06/18  04:09    


 


Hgb  12.1 g/dL (12.0-15.0)   07/06/18  04:09    


 


Hct  34.2 % (36.0-45.0)  L  07/06/18  04:09    


 


Plt Count  188 K/uL (152-406)   07/06/18  04:09    


 


PT  11.3 SECONDS (9.5-12.5)   07/04/18  23:10    


 


INR  0.96   07/04/18  23:10    


 


APTT  26.1 SECONDS (24.3-36.9)   07/04/18  23:10    


 


Sodium  133 mmol/L (136-145)  L  07/06/18  04:09    


 


Potassium  4.8 mmol/L (3.5-5.1)   07/06/18  04:09    


 


BUN  22 mg/dL (7-18)  H  07/06/18  04:09    


 


Creatinine  1.00 mg/dL (0.55-1.3)   07/06/18  04:09    


 


Glucose  104 mg/dL ()   07/06/18  04:09    


 


Phosphorus  2.7 mg/dL (2.5-4.9)   07/05/18  04:59    


 


Magnesium  1.8 mg/dL (1.8-2.4)   07/06/18  04:09    


 


Total Bilirubin  0.5 mg/dL (0.2-1.0)   07/06/18  04:09    


 


AST  26 U/L (15-37)   07/06/18  04:09    


 


ALT  22 U/L (12-78)   07/06/18  04:09    


 


Alkaline Phosphatase  84 U/L ()   07/06/18  04:09    








Home Medications: 








Amlodipine [Norvasc*] 5 mg PO DAILY #30 tab 07/06/18 


Aspirin [Aspirin EC 81 MG] 81 mg PO DAILY #30 tablet. 07/06/18 





New Medications: 


Amlodipine [Norvasc*] 5 mg PO DAILY #30 tab


Aspirin [Aspirin EC 81 MG] 81 mg PO DAILY #30 tablet.


Patient Discharge Instructions: 1.  Patient will need a follow up with a PCP in 

1 week to follow up this hospitalization.  2.  Patient presented with shortness 

of breath and fatigue.  Patient found to have hyponatremia and hyperkalemia.  

This is likely from medication.  Medications adjusted.  Patient seen by 

nephrology.  At discharge hydrochlorothiazide and lisinopril will be 

discontinued.  Patient will need to follow up with nephrology as an outpatient 

to further monitor and address.  Recommendation to recheck BMP in 1 week to 

monitor her progress.  Future medications will need to be renally dosed.  

Recommendation on no future use of nonsteroidal anti-inflammatory drugs.  3.  

Patient has hypertension.  Medications have been adjusted.  Patient will no 

longer take hydrochlorothiazide and lisinopril.  Patient will continue with 

medication-Norvasc 5 mg daily.  Recommendation is to maintain blood pressures 

less 150/80.  Further adjustment can be done by her PCP.  4.  X-ray showed 

proximal right humerus sclerotic lesion.  This is likely benign.  Degenerative 

changes noted to the right shoulder region as well.  Recommendation for the 

patient follow up with orthopedics as outpatient to further address.  Patient 

may take Tylenol as needed for pain. Recommendation on no further use of 

nonsteroidal anti-inflammatories due to her chronic renal disease.  5.  Patient 

has underlying chronic renal disease. Patient also found to have bilateral 

renal cysts.  Recommendations for the patient follow up with nephrology as an 

outpatient to further monitor.  Recommendation on no further use of 

nonsteroidal anti-inflammatories.  Future medications will need to be renally 

dosed.


Diet: Renal


Activity: Fall precautions


Time spent managing pt's care (in minutes): 55

## 2018-07-06 NOTE — P.PN
Subjective


Date of Service: 07/06/18


Primary Care Provider: [place] [facility]


Chief Complaint: Hyponatremia/AMS


Subjective: Improving





Physical Examination





- Vital Signs


Temperature: 97.6 F


Blood Pressure: 145/67


Pulse: 53


Respirations: 16


Pulse Ox (%): 97





- Physical Exam


General: Alert, In no apparent distress, Oriented x3


HEENT: Atraumatic


Neck: Supple


Respiratory: Clear to auscultation bilaterally, Normal air movement


Cardiovascular: Normal pulses, Regular rate/rhythm


Gastrointestinal: Normal bowel sounds, Soft and benign, Non-distended, No 

tenderness, No masses, No rebound, No guarding


Musculoskeletal: No warmth


Neurological: Normal speech, Normal strength at 5/5 x4 extr, Normal tone, 

Normal affect





- Studies


Medications List Reviewed: Yes





Assessment & Plan





- Problems (Diagnosis)


(1) Shortness of breath


Current Visit: Yes   Status: Acute   


Plan: 


Resolved.  Chest x-ray unremarkable.  CT head unremarkable.  Will monitor 

closely.  Patient came in with hyponatremia likely from dehydration and 

medication-lisinopril hydrochlorothiazide, which has been discontinued.  ECHO 

normal. CT scan shows parapelvic cysts. Nephrology wants to keep patient in one 

more day to monitor Sodium. Anticipate discharge tomorrow. 














(2) Hypertension


Current Visit: Yes   Status: Chronic   


Plan: 


Doing well with Norvasc 5 mg daily.  Will monitor and adjust appropriately.  

Lisinopril/hydrochlorothiazide has been discontinued due to hyponatremia.


Qualifiers: 


   Hypertension type: essential hypertension   Qualified Code(s): I10 - 

Essential (primary) hypertension   





(3) Hyperkalemia


Current Visit: Yes   Status: Acute   


Plan: 


Improved.  Patient given Kayexalate.  Will monitor closely.








(4) Renal insufficiency


Current Visit: Yes   Status: Acute   


Plan: 


Likely from dehydration and medication.  Will hold lisinopril/

hydrochlorothiazide.  Will monitor renal function.  Nephrology wants to monitor 

Na one more day. 








(5) Dehydration


Current Visit: Yes   Status: Acute   


Plan: 


Continue IV fluids.  Will monitor closely.








(6) Hyponatremia


Onset Date: 07/05/18   Current Visit: Yes   Status: Acute   


Plan: 


Continue IV fluids.  Will monitor closely. Monitor sodium one more day. 








(7) Osteoporosis


Current Visit: Yes   Status: Chronic   


Plan: 


Osteoporosis noted on hip x-ray.  No fracture noted.


Qualifiers: 


   Osteoporosis type: age-related   Presence of current pathological fracture: 

unspecified   Qualified Code(s): M81.0 - Age-related osteoporosis without 

current pathological fracture   





(8) Abnormal x-ray


Current Visit: Yes   Status: Chronic   


Plan: 


35 mm density to the right humerus likely infarct.  Will check x-ray.  Patient 

with osteoarthritis and osteoporosis.








(9) Hydronephrosis


Current Visit: Yes   Status: Suspected   


Qualifiers: 


   Hydronephrosis type: unspecified   Qualified Code(s): N13.30 - Unspecified 

hydronephrosis   





(10) Chronic renal disease


Current Visit: Yes   Status: Chronic   


Qualifiers: 


   Chronic kidney disease stage: stage 2 (mild)   Qualified Code(s): N18.2 - 

Chronic kidney disease, stage 2 (mild)   





(11) Renal cyst


Current Visit: Yes   Status: Chronic   





(12) Degenerative arthritis of shoulder region


Current Visit: Yes   Status: Chronic   


Qualifiers: 


   Osteoarthritis type: unspecified   Laterality: right   Qualified Code(s): 

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   





(13) Parapelvic renal cyst


Current Visit: Yes   Status: Acute   


Plan: 


Parapelvic cysts noted. Likely cause of Hydronephrosis. Will monitor one more 

day. 





Discharge Plan: Home


Plan to discharge in: 24 Hours


Time Spent Managing Pts Care (In Minutes): 55

## 2018-07-06 NOTE — RAD REPORT
EXAM DESCRIPTION:  CT - Abdomen   Pelvis Wo Contrast - 7/6/2018 10:48 am

 

CLINICAL HISTORY:  Abnormal renal function, hydronephrosis, abnormal renal ultrasound

 

COMPARISON:  Renal ultrasound July 5

 

TECHNIQUE:  Axial 5 mm thick CT imaging of the abdomen and pelvis was performed without IV contrast. 
No IV contrast was given because of allergy, abnormal renal function, patient refusal or physician re
quest. Oral contrast was given.

 

All CT scans are performed using dose optimization technique as appropriate and may include automated
 exposure control or mA/KV adjustment according to patient size.

 

FINDINGS:  Trace amount of pleural and parenchymal scarring change in the posterior gutter on the lef
t. No pericardial effusion.

 

The liver, spleen and pancreas show no suspicious findings on non-contrast imaging. Gallbladder is co
ntracted around several small gallstones. No biliary tree dilatation.

 

No assessment of renal function can't be made on a noncontrast study. Isodense masses and pyelonephri
tis cannot be excluded. Patient has demonstrated bilateral renal cysts. Low-density mass approximatel
y 3 cm in size present in the mid and upper medial portion of the right renal hilum. A 2 x 1.5 centim
eter low-density mass is present in the central left kidney. No hydroureter is present. No urinary bl
adder abnormality seen. Collecting system assessment is very limited in the absence of contrast. It i
s favored that the hydronephrosis appearance on the ultrasound is due to parapelvic cysts. Mild fulln
ess of the left renal pelvis is possible. No significant adrenal finding.

 

No suspicious uterine or ovarian finding.

 

No dilated bowel loops or bowel wall thickening. No free air, free fluid or inflammatory stranding. N
o hernia, mass or bulky lymphadenopathy.

 

Prominent disc and bony degenerative change present. No pathologic bone process.

 

 

IMPRESSION:  Patient has multiple cortical and parapelvic cysts evident. No hydroureter present.

 

The suspected or possible hydronephrosis on sonography is favored to be due to the parapelvic cysts. 
This could be confirmed at a time when the patient can undergo a contrast CT study.

 

Gallbladder is contracted around multiple gallstones. No acute gallbladder or biliary tree finding.

 

As detailed above, no additional acute or worrisome findings.

 

Full assessment is limited is the absence of IV contrast.

## 2018-07-07 LAB
BUN BLD-MCNC: 22 MG/DL (ref 7–18)
GLUCOSE SERPLBLD-MCNC: 95 MG/DL (ref 74–106)
HCT VFR BLD CALC: 34.4 % (ref 36–45)
LYMPHOCYTES # SPEC AUTO: 1.6 K/UL (ref 0.7–4.9)
MAGNESIUM SERPL-MCNC: 2 MG/DL (ref 1.8–2.4)
MCH RBC QN AUTO: 31 PG (ref 27–35)
MCV RBC: 90.4 FL (ref 80–100)
PMV BLD: 9.5 FL (ref 7.6–11.3)
POTASSIUM SERPL-SCNC: 4.9 MMOL/L (ref 3.5–5.1)
RBC # BLD: 3.81 M/UL (ref 3.86–4.86)

## 2018-07-07 RX ADMIN — AMLODIPINE BESYLATE SCH MG: 5 TABLET ORAL at 08:24

## 2018-07-07 RX ADMIN — Medication SCH ML: at 08:28

## 2018-07-07 NOTE — P.DS
Admission Date: 07/05/18


Discharge Date: 07/07/18


Primary Care Provider: Graciela Thakkar


Disposition: ROUTINE DISCHARGE


Discharge Condition: GOOD


Reason for Admission: Hyponatremia/AMS


Brief History of Present Illness: 





Patient is 88 years of age admitted with confusion and severe hyponatremia due 

to a combination of hydrochlorothiazide and excessive fluid intake


Hospital Course: 





Patient did well no new complaints at the time of discharge she was alert 

oriented responsive cooperative [place]ian-speaking only


Hypernatremia resolved vital signs all stable blood pressure is little mildly 

elevated initially on admission sodium was 123 at the time of [id number]


No evidence of hypothyroidism or adrenal insufficiency


Patient was alert cooperative chest clear abdomen soft extremities no edema .  

Neurologically alert responsive cooperative moving all her extremities


Patient has multiple cortical and parapelvic cysts evident. No hydroureter 

present


Increased renal echotexture consistent with parenchymal disease


 


Bilateral renal cysts


 


Mild bilateral hydronephrosis is suspected


No obstructive uropathy


Vital Signs/Physical Exam: 














Temp Pulse Resp BP Pulse Ox


 


 98.4 F   62   16   155/64 H  99 


 


 07/07/18 08:00  07/07/18 08:24  07/07/18 08:00  07/07/18 08:24  07/07/18 08:00








Laboratory Data at Discharge: 














WBC  6.8 K/uL (4.3-10.9)   07/07/18  04:08    


 


Hgb  11.8 g/dL (12.0-15.0)  L  07/07/18  04:08    


 


Hct  34.4 % (36.0-45.0)  L  07/07/18  04:08    


 


Plt Count  201 K/uL (152-406)   07/07/18  04:08    


 


PT  11.3 SECONDS (9.5-12.5)   07/04/18  23:10    


 


INR  0.96   07/04/18  23:10    


 


APTT  26.1 SECONDS (24.3-36.9)   07/04/18  23:10    


 


Sodium  139 mmol/L (136-145)   07/07/18  04:08    


 


Potassium  4.9 mmol/L (3.5-5.1)   07/07/18  04:08    


 


BUN  22 mg/dL (7-18)  H  07/07/18  04:08    


 


Creatinine  1.10 mg/dL (0.55-1.3)   07/07/18  04:08    


 


Glucose  95 mg/dL ()   07/07/18  04:08    


 


Phosphorus  2.7 mg/dL (2.5-4.9)   07/05/18  04:59    


 


Magnesium  2.0 mg/dL (1.8-2.4)   07/07/18  04:08    


 


Total Bilirubin  0.5 mg/dL (0.2-1.0)   07/06/18  04:09    


 


AST  26 U/L (15-37)   07/06/18  04:09    


 


ALT  22 U/L (12-78)   07/06/18  04:09    


 


Alkaline Phosphatase  84 U/L ()   07/06/18  04:09    








Home Medications: 








Amlodipine [Norvasc*] 5 mg PO DAILY #30 tab 07/06/18 


Aspirin [Aspirin EC 81 MG] 81 mg PO DAILY #30 tablet. 07/06/18 





New Medications: 


Amlodipine [Norvasc*] 5 mg PO DAILY #30 tab


Aspirin [Aspirin EC 81 MG] 81 mg PO DAILY #30 tablet.


Patient Discharge Instructions: Patient's hydrochlorothiazide was stopped


Diet: Regular


Activity: Ad doretha

## 2018-07-07 NOTE — PN
Date of Progress Note:  07/06/2018



Chief Complaint:  Hyponatremia.



History Of Present Illness:  The patient was found to have hyponatremia, 
moderate to severe, due to HCTZ.  Primarily, she was started on normal saline 
and blood work was obtained.  Sodium has  improved.  Subsequently, she was 
initiated on D5W to obtain gradual correction of the hyponatremia. 



The patient denies complaints.



Physical Examination:

Lungs:  Clear to auscultation bilaterally. 

Heart:  S1, S2.  

Abdomen:  Soft, benign. 

Extremities:  Minimal edema.



Blood Work:  Sodium 133, potassium 4.3, chloride 100, CO2 of 27, BUN 21, 
creatinine 1.0, glucose 92, calcium 8.8.



Imaging:  Renal ultrasound was done to rule out obstructive uropathy.  
Ultrasound showed questionable hydronephrosis, increased echotexture consistent 
with medical renal disease.  CT scan of the abdomen and pelvis was done without 
contrast per stone protocol to evaluate hydronephrosis.  The patient was found 
to have multiple cortical and parapelvic cysts.  No kidney stones were 
identified.  No hydroureter was present.



Impression And Plan:  

1.   Hyponatremia, secondary to HCTZ.  The patient was taken off HCTZ.  Monitor 
electrolytes and adjust fluids.  Workup was done to rule out hypothyroidism.

2.   Hyperkalemia, resolved.  The patient currently is off ACE inhibitor.  
Monitor electrolytes closely and adjust IV fluids.

3.   Renal ultrasound showed questionable hydronephrosis.  CT scan did 

not confirm hydronephrosis although which showed multiple parapelvic cysts.  
There is no obstructive uropathy present.





ALENA/ZECHARIAH

DD:  07/06/2018 21:27:48   Voice ID:  [id number]

DT:  07/06/2018 23:56:22   Report ID:  [id number]

GIANLUCA

## 2018-07-25 ENCOUNTER — HOSPITAL ENCOUNTER (EMERGENCY)
Dept: HOSPITAL 97 - ER | Age: 83
Discharge: HOME | End: 2018-07-25
Payer: SELF-PAY

## 2018-07-25 DIAGNOSIS — E86.0: ICD-10-CM

## 2018-07-25 DIAGNOSIS — I10: ICD-10-CM

## 2018-07-25 DIAGNOSIS — K59.00: Primary | ICD-10-CM

## 2018-07-25 PROCEDURE — 99284 EMERGENCY DEPT VISIT MOD MDM: CPT

## 2018-07-25 PROCEDURE — 93005 ELECTROCARDIOGRAM TRACING: CPT

## 2018-07-25 NOTE — EKG
Test Date:    2018-07-25               Test Time:    17:27:59

Technician:   LAOK                                     

                                                     

MEASUREMENT RESULTS:                                       

Intervals:                                           

Rate:         66                                     

WY:           166                                    

QRSD:         98                                     

QT:           386                                    

QTc:          404                                    

Axis:                                                

P:            41                                     

WY:           166                                    

QRS:          -8                                     

T:            63                                     

                                                     

INTERPRETIVE STATEMENTS:                                       

                                                     

Normal sinus rhythm

Minimal voltage criteria for LVH, may be normal variant

Borderline ECG

Compared to ECG 07/04/2018 23:15:19

Left ventricular hypertrophy now present

Sinus bradycardia no longer present



Electronically Signed On 07-25-18 20:29:57 CDT by Bruce Wood

## 2018-07-25 NOTE — ER
Nurse's Notes                                                                                     

 [facility]                                                                

Name: Niya Kumar                                                                             

Age: 88 yrs                                                                                       

Sex: Female                                                                                       

: 1929                                                                                   

MRN: [id number]                                                                                   

Arrival Date: 2018                                                                          

Time: 16:43                                                                                       

Account#: [id number]                                                                            

Bed 8                                                                                             

Private MD:                                                                                       

Diagnosis: Constipation, unspecified;Dehydration                                                  

                                                                                                  

Presentation:                                                                                     

                                                                                             

16:55 Presenting complaint: Child states: pt called her saying that her BP was high and has   sv  

      been constipated and the pt gave herself 3 suppositories at home and has been having        

      diarrhea about 5 episodes. Pt c/o dizziness today. Transition of care: patient was not      

      received from another setting of care. Onset of symptoms was 2018. Care prior      

      to arrival: None.                                                                           

16:55 Method Of Arrival: Wheelchair                                                           sv  

16:55 Acuity: FRANTZ 3                                                                           sv  

17:57 Risk Assessment: Do you want to hurt yourself or someone else? Patient reports no       ae1 

      desire to harm self or others. Initial Sepsis Screen: Does the patient meet any 2           

      criteria? No. Patient's initial sepsis screen is negative. Does the patient have a          

      suspected source of infection? No. Patient's initial sepsis screen is negative.             

                                                                                                  

Historical:                                                                                       

- Allergies:                                                                                      

16:57 No Known Allergies;                                                                     sv  

- Home Meds:                                                                                      

17:58 amlodipine oral [Active]; aspirin 81 mg Oral chew 1 tab once daily [Active];            ae1 

- PMHx:                                                                                           

16:57 Anxiety; EDEMA; Hypertension;                                                           sv  

                                                                                                  

- Immunization history:: Adult Immunizations up to date.                                          

- Social history:: Smoking status: Patient/guardian denies using tobacco.                         

- Ebola Screening: : No symptoms or risks identified at this time.                                

- Family history:: not pertinent.                                                                 

- Hospitalizations: : No recent hospitalization is reported.                                      

                                                                                                  

                                                                                                  

Screenin:56 Abuse screen: Denies threats or abuse. Nutritional screening: No deficits noted.        ae1 

      Tuberculosis screening: No symptoms or risk factors identified. Fall Risk None              

      identified.                                                                                 

                                                                                                  

Assessment:                                                                                       

17:55 General: Appears in no apparent distress. comfortable, obese, unkempt, Behavior is      ae1 

      calm, cooperative. Pain: Denies pain. Neuro: Level of Consciousness is awake, alert,        

      obeys commands, Oriented to person, place, time, situation. Cardiovascular: Patient's       

      skin is warm and dry. Respiratory: Airway is patent Respiratory effort is even,             

      unlabored, Respiratory pattern is regular, symmetrical. GI: Abdomen is round obese,         

      Bowel sounds present X 4 quads. Abd is soft and non tender X 4 quads. : No signs          

      and/or symptoms were reported regarding the genitourinary system. EENT: No signs and/or     

      symptoms were reported regarding the EENT system. Derm: dried stool noticed on ANTHONY feet     

      and legs. Musculoskeletal: No signs and/or symptoms reported regarding the                  

      musculoskeletal system.                                                                     

                                                                                                  

Vital Signs:                                                                                      

16:57  / 86; Pulse 67; Resp 18; Temp 97.9; Pulse Ox 98% ; Weight 102.51 kg; Height 4    sv  

      ft. 10 in. (147.32 cm); Pain 0/10;                                                          

16:57 Body Mass Index 47.23 (102.51 kg, 147.32 cm)                                            sv  

                                                                                                  

ED Course:                                                                                        

16:43 Patient arrived in ED.                                                                  rg4 

16:56 Triage completed.                                                                       sv  

16:57 Arm band placed on right wrist.                                                         sv  

17:22 Pierre Goetz MD is Attending Physician.                                                rn  

17:22 Dixon Garza, RN is Primary Nurse.                                                   ae1 

17:46 EKG done, by EKG tech. reviewed by Pierre Goetz MD.                                      3 

17:57 Placed in gown. Bed in low position. Call light in reach. Side rails up X 1. Adult w/   ae1 

      patient. Cardiac monitor on. Pulse ox on. NIBP on. Warm blanket given.                      

17:57 No provider procedures requiring assistance completed. Patient did not have IV access   ae1 

      during this emergency room visit.                                                           

                                                                                                  

Administered Medications:                                                                         

No medications were administered                                                                  

                                                                                                  

                                                                                                  

Outcome:                                                                                          

17:55 Discharge ordered by MD.                                                                rn  

17:57 Condition: stable                                                                       ae1 

18:07 Discharged to home via wheelchair, with friend.                                         ae1 

18:07 Discharge instructions given to patient, friend.                                            

18:08 Patient left the ED.                                                                    ae1 

                                                                                                  

Signatures:                                                                                       

Mel Patrick RN RN sv Nieto, Roman, MD MD rn Elliott, Andrea, RN                     RN   ae1                                                  

Jennifer Daniels                                 rg4                                                  

Lizbeth Austin                              3                                                  

                                                                                                  

**************************************************************************************************

## 2018-07-25 NOTE — EDPHYS
Physician Documentation                                                                           

 [facility]                                                                

Name: Niya Kumar                                                                             

Age: 88 yrs                                                                                       

Sex: Female                                                                                       

: 1929                                                                                   

MRN: [id number]                                                                                   

Arrival Date: 2018                                                                          

Time: 16:43                                                                                       

Account#: [id number]                                                                            

Bed 8                                                                                             

Private MD:                                                                                       

ED Physician Pierre Goetz                                                                         

HPI:                                                                                              

                                                                                             

17:52 This 88 yrs old  Female presents to ER via Wheelchair with complaints of        rn  

      Dizziness, Diarrhea, High Blood Pressure.                                                   

17:52 The patient presents to the emergency department with constipation. Onset: The          rn  

      symptoms/episode began/occurred 4 day(s) ago. Possible causes: unknown. Associated          

      signs and symptoms: Pertinent positives: constipation, diarrhea. Severity of symptoms:      

      At their worst the symptoms were moderate in the emergency department the symptoms have     

      resolved. The patient has experienced similar episodes in the past. Reports had been        

      having constipation for 4 days without bowel movement, took 3 suppositories, then had       

      diarrhea, was straining and frustrated she couldn't go, noticed dizziness and headache,     

      checked BP, was high, came in for evaluation. When daughter picked her up, had "very        

      large" bowel movement, formed, was hard, then feels like evacuated. Denies weakness/abd     

      pain/vomiting. Now BP better. .                                                             

                                                                                                  

Historical:                                                                                       

- Allergies:                                                                                      

16:57 No Known Allergies;                                                                     sv  

- Home Meds:                                                                                      

17:58 amlodipine oral [Active]; aspirin 81 mg Oral chew 1 tab once daily [Active];            ae1 

- PMHx:                                                                                           

16:57 Anxiety; EDEMA; Hypertension;                                                           sv  

                                                                                                  

- Immunization history:: Adult Immunizations up to date.                                          

- Social history:: Smoking status: Patient/guardian denies using tobacco.                         

- Ebola Screening: : No symptoms or risks identified at this time.                                

- Family history:: not pertinent.                                                                 

- Hospitalizations: : No recent hospitalization is reported.                                      

                                                                                                  

                                                                                                  

ROS:                                                                                              

17:52 Constitutional: Negative for fever, chills, and weight loss, Eyes: Negative for injury, rn  

      pain, redness, and discharge, Neck: Negative for injury, pain, and swelling,                

      Cardiovascular: Negative for chest pain, palpitations, and edema, Respiratory: Negative     

      for shortness of breath, cough, wheezing, and pleuritic chest pain, Abdomen/GI:             

      Negative for abdominal pain, nausea, vomiting Back: Negative for injury and pain,           

      MS/Extremity: Negative for injury and deformity, Skin: Negative for injury, rash, and       

      discoloration, Neuro: Negative for headache, weakness, numbness, tingling, and seizure.     

                                                                                                  

Exam:                                                                                             

17:52 Constitutional:  This is a well developed, well nourished patient who is awake, alert,  rn  

      and in no acute distress. Head/Face:  Normocephalic, atraumatic. Eyes:  Pupils equal        

      round and reactive to light, extra-ocular motions intact.  Lids and lashes normal.          

      Conjunctiva and sclera are non-icteric and not injected.  Cornea within normal limits.      

      Periorbital areas with no swelling, redness, or edema. ENT:  MMM Cardiovascular:            

      Regular rate and rhythm with a normal S1 and S2.  No gallops, murmurs, or rubs.  Normal     

      PMI, no JVD.  No pulse deficits. Respiratory:  Lungs have equal breath sounds               

      bilaterally, clear to auscultation and percussion.  No rales, rhonchi or wheezes noted.     

       No increased work of breathing, no retractions or nasal flaring. Abdomen/GI:  Soft,        

      non-tender, with normal bowel sounds.  No distension or tympany.  No guarding or            

      rebound.  No evidence of tenderness throughout. MS/ Extremity:  Pulses equal, no            

      cyanosis.  Neurovascular intact.  Full, normal range of motion.  Equal circumference.       

      Neuro:  Awake and alert, GCS 15, oriented to person, place, time, and situation.            

      Cranial nerves II-XII grossly intact.  Motor strength 5/5 in all extremities.  Sensory      

      grossly intact.                                                                             

                                                                                                  

Vital Signs:                                                                                      

16:57  / 86; Pulse 67; Resp 18; Temp 97.9; Pulse Ox 98% ; Weight 102.51 kg; Height 4    sv  

      ft. 10 in. (147.32 cm); Pain 0/10;                                                          

16:57 Body Mass Index 47.23 (102.51 kg, 147.32 cm)                                            sv  

                                                                                                  

MDM:                                                                                              

17:22 Patient medically screened.                                                             rn  

17:52 Differential diagnosis: constipation, dehydration. Data reviewed: vital signs, nurses   rn  

      notes, and as a result, I will discharge patient. Counseling: I had a detailed              

      discussion with the patient and/or guardian regarding: the historical points, exam          

      findings, and any diagnostic results supporting the discharge/admit diagnosis, the need     

      for outpatient follow up, to return to the emergency department if symptoms worsen or       

      persist or if there are any questions or concerns that arise at home. Special               

      discussion: I discussed with the patient/guardian in detail that at this point there is     

      no indication for admission to the hospital. It is understood, however, that if the         

      symptoms persist or worsen the patient needs to return immediately for re-evaluation.       

      ED course: Pt now has had large bowel movement, was likely experiencing fecal               

      impaction, states chronically constipated, but worse lately, no abd pain, normal vitals     

      except mildly elevated BP, will dc home with miralax and hydration. Will return if          

      worsens. .                                                                                  

                                                                                                  

Administered Medications:                                                                         

No medications were administered                                                                  

                                                                                                  

                                                                                                  

Disposition:                                                                                      

18 17:55 Discharged to Home. Impression: Constipation, unspecified, Dehydration.            

- Condition is Stable.                                                                            

- Discharge Instructions: Constipation, Adult, Dehydration, Adult.                                

- Prescriptions for Miralax 17 gram/dose Oral - take 1 packet by ORAL route once daily            

  dilute powder in 8 ounces of water or juice; 1 bottle.                                          

- Medication Reconciliation Form, Thank You Letter, Antibiotic Education, Prescription            

  Opioid Use form.                                                                                

- Follow up: Private Physician; When: As needed; Reason: Recheck today's complaints,              

  Re-evaluation by your physician.                                                                

- Problem is new.                                                                                 

- Symptoms have improved.                                                                         

                                                                                                  

                                                                                                  

                                                                                                  

Signatures:                                                                                       

Mel Patrick RN RN sv Nieto, Roman, MD MD rn Elliott, Andrea, RN                     RN   ae1                                                  

                                                                                                  

Corrections: (The following items were deleted from the chart)                                    

18:08 17:55 2018 17:55 Discharged to Home. Impression: Constipation, unspecified;       ae1 

      Dehydration. Condition is Stable. Forms are Medication Reconciliation Form, Thank You       

      Letter, Antibiotic Education, Prescription Opioid Use. Follow up: Private Physician;        

      When: As needed; Reason: Recheck today's complaints, Re-evaluation by your physician.       

      Problem is new. Symptoms have improved. rn                                                  

                                                                                                  

**************************************************************************************************

## 2019-01-23 ENCOUNTER — HOSPITAL ENCOUNTER (EMERGENCY)
Dept: HOSPITAL 97 - ER | Age: 84
Discharge: HOME | End: 2019-01-23
Payer: SELF-PAY

## 2019-01-23 DIAGNOSIS — E86.0: Primary | ICD-10-CM

## 2019-01-23 DIAGNOSIS — I10: ICD-10-CM

## 2019-01-23 DIAGNOSIS — F41.9: ICD-10-CM

## 2019-01-23 LAB
BUN BLD-MCNC: 25 MG/DL (ref 7–18)
GLUCOSE SERPLBLD-MCNC: 176 MG/DL (ref 74–106)
HCT VFR BLD CALC: 42.4 % (ref 36–45)
LYMPHOCYTES # SPEC AUTO: 1.7 K/UL (ref 0.7–4.9)
PMV BLD: 9.5 FL (ref 7.6–11.3)
POTASSIUM SERPL-SCNC: 3.8 MMOL/L (ref 3.5–5.1)
RBC # BLD: 4.64 M/UL (ref 3.86–4.86)
TROPONIN (EMERG DEPT USE ONLY): < 0.02 NG/ML (ref 0–0.04)
UA COMPLETE W REFLEX CULTURE PNL UR: (no result)

## 2019-01-23 PROCEDURE — 84484 ASSAY OF TROPONIN QUANT: CPT

## 2019-01-23 PROCEDURE — 81003 URINALYSIS AUTO W/O SCOPE: CPT

## 2019-01-23 PROCEDURE — 96360 HYDRATION IV INFUSION INIT: CPT

## 2019-01-23 PROCEDURE — 36415 COLL VENOUS BLD VENIPUNCTURE: CPT

## 2019-01-23 PROCEDURE — 80048 BASIC METABOLIC PNL TOTAL CA: CPT

## 2019-01-23 PROCEDURE — 70450 CT HEAD/BRAIN W/O DYE: CPT

## 2019-01-23 PROCEDURE — 85025 COMPLETE CBC W/AUTO DIFF WBC: CPT

## 2019-01-23 PROCEDURE — 93005 ELECTROCARDIOGRAM TRACING: CPT

## 2019-01-23 PROCEDURE — 81015 MICROSCOPIC EXAM OF URINE: CPT

## 2019-01-23 PROCEDURE — 99285 EMERGENCY DEPT VISIT HI MDM: CPT

## 2019-01-23 NOTE — EDPHYS
Physician Documentation                                                                           

 [facility]                                                                

Name: Niya Kumar                                                                             

Age: 89 yrs                                                                                       

Sex: Female                                                                                       

: 1929                                                                                   

MRN: [id number]                                                                                   

Arrival Date: 2019                                                                          

Time: 13:48                                                                                       

Account#: [id number]                                                                            

Bed 4                                                                                             

Private MD: None, None                                                                            

ED Physician Pierre Goetz                                                                         

HPI:                                                                                              

                                                                                             

14:21 This 89 yrs old  Female presents to ER via Wheelchair with complaints of High   rn  

      Blood Pressure, dizziness.                                                                  

14:21 The patient has elevated blood pressure and discovered this at home.                    rn  

14:21 The patient presents with dizziness, feeling faint, generalized weakness. Onset: The    rn  

      symptoms/episode began/occurred this morning. Modifying factors: The symptoms are           

      alleviated by nothing, the symptoms are aggravated by standing up. Severity of              

      symptoms: At their worst the symptoms were mild in the emergency department the             

      symptoms have resolved. The patient has not experienced similar symptoms in the past.       

      The patient has not recently seen a physician. Reports this morning began with              

      dizziness, feels lightheaded, worse with change in position, no fall or syncope,            

      reports had "poking chest pain" that only lasted for a few seconds earlier, now             

      resolved. Currently feels better. Reports only takes 1 BP med, and is compliant. No         

      current chest pain. No abd pain/cough/sob/vomiting/diarrhea. .                              

                                                                                                  

Historical:                                                                                       

- Allergies:                                                                                      

14:11 No Known Allergies;                                                                     ca1 

- Home Meds:                                                                                      

14:11 losartan 50 mg oral tab 1 tab once daily [Active];                                      ca1 

- PMHx:                                                                                           

14:11 Anxiety; EDEMA; Hypertension;                                                           ca1 

- PSHx:                                                                                           

14:11 None;                                                                                   ca1 

                                                                                                  

- Immunization history:: Flu vaccine is not up to date.                                           

- Social history:: Smoking status: Patient/guardian denies using tobacco.                         

- Ebola Screening: : No symptoms or risks identified at this time.                                

- Family history:: not pertinent.                                                                 

- Hospitalizations: : No recent hospitalization is reported.                                      

                                                                                                  

                                                                                                  

ROS:                                                                                              

14:21 Constitutional: Negative for fever, chills, and weight loss, Eyes: Negative for injury, rn  

      pain, redness, and discharge, Neck: Negative for injury, pain, and swelling,                

      Cardiovascular: Negative for palpitations, and edema, Respiratory: Negative for             

      shortness of breath, cough, wheezing, and pleuritic chest pain, Abdomen/GI: Negative        

      for abdominal pain, nausea, vomiting, diarrhea, and constipation, MS/Extremity:             

      Negative for injury and deformity, Skin: Negative for injury, rash, and discoloration,      

      Neuro: Negative for headache, numbness, tingling, and seizure.                              

                                                                                                  

Exam:                                                                                             

14:21 Constitutional:  Overweight female without acute distress, ambulatory Head/Face:        rn  

      Normocephalic, atraumatic. Eyes:  Pupils equal round and reactive to light,                 

      extra-ocular motions intact.  Lids and lashes normal.  Conjunctiva and sclera are           

      non-icteric and not injected.  Cornea within normal limits.  Periorbital areas with no      

      swelling, redness, or edema. Cardiovascular:  Regular rate and rhythm, no JVD.  No          

      pulse deficits. Respiratory:  Lungs have equal breath sounds bilaterally, clear to          

      auscultation and percussion.  No rales, rhonchi or wheezes noted.  No increased work of     

      breathing, no retractions or nasal flaring. Abdomen/GI:  Soft, non-tender, with normal      

      bowel sounds.  No distension or tympany.  No guarding or rebound.  No evidence of           

      tenderness throughout. Skin:  Warm, dry with normal turgor.  Normal color with no           

      rashes, no lesions, and no evidence of cellulitis. MS/ Extremity:  Pulses equal, no         

      cyanosis.  Neurovascular intact.  Full, normal range of motion.  Equal circumference.       

      Neuro:  Awake and alert, GCS 15, oriented to person, place, time, and situation.            

      Cranial nerves II-XII grossly intact.  Motor strength 5/5 in all extremities.  Sensory      

      grossly intact.  Cerebellar exam normal.                                                    

                                                                                                  

Vital Signs:                                                                                      

14:11  / 55; Pulse 60; Resp 16; Temp 98.4; Pulse Ox 96% on R/A; Weight 102.51 kg;       ca1 

      Height 4 ft. 11 in. (149.86 cm); Pain 0/10;                                                 

15:16  / 45; Pulse 55; Resp 17; Pulse Ox 100% on R/A;                                   tw2 

16:20  / 48; Pulse 57; Resp 17; Pulse Ox 97% on R/A;                                    tw2 

14:11 Body Mass Index 45.65 (102.51 kg, 149.86 cm)                                            ca1 

15:16 provider notified.                                                                      tw2 

                                                                                                  

MDM:                                                                                              

14:14 Patient medically screened.                                                             rn  

15:58 Differential diagnosis: generalized weakness, hypovolemia, idiopathic dizziness. Data   rn  

      reviewed: vital signs, nurses notes, lab test result(s), EKG, radiologic studies, CT        

      scan, and as a result, I will discharge patient. Counseling: I had a detailed               

      discussion with the patient and/or guardian regarding: the historical points, exam          

      findings, and any diagnostic results supporting the discharge/admit diagnosis, the          

      presence of at least one elevated blood pressure reading (>120/80) during this              

      emergency department visit, lab results, radiology results, the need for outpatient         

      follow up, to return to the emergency department if symptoms worsen or persist or if        

      there are any questions or concerns that arise at home. Response to treatment: the          

      patient's symptoms have markedly improved after treatment, the patient's condition has      

      returned to base line, the patient is now symptom free, and as a result, I will             

      discharge patient. Special discussion: I discussed with the patient/guardian in detail      

      that at this point there is no indication for admission to the hospital. It is              

      understood, however, that if the symptoms persist or worsen the patient needs to return     

      immediately for re-evaluation.                                                              

                                                                                                  

                                                                                             

14:20 Order name: CBC with Diff; Complete Time: 14:59                                         rn  

                                                                                             

14:20 Order name: Basic Metabolic Panel; Complete Time: 15:53                                 rn  

                                                                                             

14:20 Order name: CT Head Brain wo Cont; Complete Time: 15:06                                 rn  

                                                                                             

14:20 Order name: Troponin (emerg Dept Use Only); Complete Time: 15:53                        rn  

                                                                                             

14:20 Order name: Urine Microscopic Only                                                      rn  

                                                                                             

14:44 Order name: Urine Dipstick--Ancillary (enter results); Complete Time: 15:53               

                                                                                             

14:20 Order name: EKG; Complete Time: 14:22                                                   rn  

                                                                                             

14:20 Order name: EKG - Nurse/Tech; Complete Time: 15:19                                      rn  

                                                                                             

14:20 Order name: Urine Dipstick-Ancillary (obtain specimen); Complete Time: 14:43            rn  

                                                                                                  

Administered Medications:                                                                         

14:52 Drug: NS 0.9% 500 ml Route: IV; Rate: bolus; Site: left antecubital;                    tw2 

16:00 Follow up: Response: No adverse reaction; IV Status: Completed infusion; IV Intake:     tw2 

      500ml                                                                                       

                                                                                                  

                                                                                                  

Disposition:                                                                                      

19 15:59 Discharged to Home. Impression: Dehydration, Dizziness and giddiness.              

- Condition is Stable.                                                                            

- Discharge Instructions: Dehydration, Adult, Dizziness.                                          

                                                                                                  

- Medication Reconciliation Form, Thank You Letter, Antibiotic Education, Prescription            

  Opioid Use form.                                                                                

- Follow up: Private Physician; When: As needed; Reason: Recheck today's complaints,              

  Re-evaluation by your physician.                                                                

- Problem is new.                                                                                 

- Symptoms have improved.                                                                         

                                                                                                  

                                                                                                  

                                                                                                  

Signatures:                                                                                       

Dispatcher [employer]                           EDPierre Anthony MD MD rn Wise, Tara RN                          RN   tw2                                                  

Celestina Aj RN                        RN   ca1                                                  

                                                                                                  

Corrections: (The following items were deleted from the chart)                                    

16:21 15:59 2019 15:59 Discharged to Home. Impression: Dehydration; Dizziness and       tw2 

      giddiness. Condition is Stable. Forms are Medication Reconciliation Form, Thank You         

      Letter, Antibiotic Education, Prescription Opioid Use. Follow up: Private Physician;        

      When: As needed; Reason: Recheck today's complaints, Re-evaluation by your physician.       

      Problem is new. Symptoms have improved. rn                                                  

                                                                                                  

**************************************************************************************************

## 2019-01-23 NOTE — ER
Nurse's Notes                                                                                     

 [facility]                                                                

Name: Niya Kumar                                                                             

Age: 89 yrs                                                                                       

Sex: Female                                                                                       

: 1929                                                                                   

MRN: [id number]                                                                                   

Arrival Date: 2019                                                                          

Time: 13:48                                                                                       

Account#: [id number]                                                                            

Bed 4                                                                                             

Private MD: None, None                                                                            

Diagnosis: Dehydration;Dizziness and giddiness                                                    

                                                                                                  

Presentation:                                                                                     

                                                                                             

14:08 Presenting complaint: Daughter states pt's BP is 217/100 at home around 1100H today.    ca1 

      Transition of care: patient was not received from another setting of care. Onset of         

      symptoms was 2019. Risk Assessment: Do you want to hurt yourself or someone     

      else? Patient reports no desire to harm self or others. Initial Sepsis Screen: Does the     

      patient meet any 2 criteria? No. Patient's initial sepsis screen is negative. Does the      

      patient have a suspected source of infection? No. Patient's initial sepsis screen is        

      negative. Care prior to arrival: None.                                                      

14:08 Method Of Arrival: Wheelchair                                                           ca1 

14:08 Acuity: FRANTZ 3                                                                           ca1 

                                                                                                  

Historical:                                                                                       

- Allergies:                                                                                      

14:11 No Known Allergies;                                                                     ca1 

- Home Meds:                                                                                      

14:11 losartan 50 mg oral tab 1 tab once daily [Active];                                      ca1 

- PMHx:                                                                                           

14:11 Anxiety; EDEMA; Hypertension;                                                           ca1 

- PSHx:                                                                                           

14:11 None;                                                                                   ca1 

                                                                                                  

- Immunization history:: Flu vaccine is not up to date.                                           

- Social history:: Smoking status: Patient/guardian denies using tobacco.                         

- Ebola Screening: : No symptoms or risks identified at this time.                                

- Family history:: not pertinent.                                                                 

- Hospitalizations: : No recent hospitalization is reported.                                      

                                                                                                  

                                                                                                  

Screenin:14 Abuse screen: Denies threats or abuse. Nutritional screening: No deficits noted.        tw2 

      Tuberculosis screening: No symptoms or risk factors identified. Fall Risk None              

      identified.                                                                                 

                                                                                                  

Assessment:                                                                                       

14:15 General: Appears obese, Behavior is calm, cooperative, appropriate for age. Pain:.      tw2 

      Neuro: Level of Consciousness is awake, alert, obeys commands, Oriented to person,          

      place, time, situation. Cardiovascular: Heart tones S1 S2 Capillary refill < 3 seconds      

      Patient's skin is warm and dry. Respiratory: Airway is patent Respiratory effort is         

      even, unlabored, Respiratory pattern is regular, symmetrical, Breath sounds are clear       

      bilaterally. GI: Abdomen is round obese, Bowel sounds present X 4 quads. : No signs       

      and/or symptoms were reported regarding the genitourinary system. EENT: No signs and/or     

      symptoms were reported regarding the EENT system. Derm: No signs and/or symptoms            

      reported regarding the dermatologic system. Musculoskeletal: Circulation, motion, and       

      sensation intact. Range of motion: intact in all extremities.                               

15:17 Reassessment: Patient appears in no apparent distress at this time. No changes from     tw2 

      previously documented assessment. Patient and/or family updated on plan of care and         

      expected duration. Pain level reassessed. Patient is alert, oriented x 3, equal             

      unlabored respirations, skin warm/dry/pink.                                                 

16:20 Reassessment: Patient appears in no apparent distress at this time. No changes from     tw2 

      previously documented assessment. Patient and/or family updated on plan of care and         

      expected duration. Pain level reassessed. Patient is alert, oriented x 3, equal             

      unlabored respirations, skin warm/dry/pink.                                                 

                                                                                                  

Vital Signs:                                                                                      

14:11  / 55; Pulse 60; Resp 16; Temp 98.4; Pulse Ox 96% on R/A; Weight 102.51 kg;       ca1 

      Height 4 ft. 11 in. (149.86 cm); Pain 0/10;                                                 

15:16  / 45; Pulse 55; Resp 17; Pulse Ox 100% on R/A;                                   tw2 

16:20  / 48; Pulse 57; Resp 17; Pulse Ox 97% on R/A;                                    tw2 

14:11 Body Mass Index 45.65 (102.51 kg, 149.86 cm)                                            ca1 

15:16 provider notified.                                                                      tw2 

                                                                                                  

ED Course:                                                                                        

13:48 Patient arrived in ED.                                                                  mr  

13:48 None, None is Private Physician.                                                        mr  

14:10 Triage completed.                                                                       ca1 

14:11 Arm band placed on right wrist.                                                         ca1 

14:14 Pierre Goetz MD is Attending Physician.                                                rn  

14:14 Marisela Caicedo RN is Primary Nurse.                                                        tw2 

14:14 Bed in low position. Call light in reach. Cardiac monitor on. Pulse ox on. NIBP on.     tw2 

14:30 Inserted saline lock: 22 gauge in left antecubital area, using aseptic technique.       tw2 

14:35 Missed attempt(s): 22 gauge in right antecubital area. blood collected. Bleeding        tw2 

      controlled, band aid applied, catheter tip intact.                                          

14:42 CT completed. Patient tolerated procedure well. Patient moved to CT via wheelchair.     vm2 

      Patient moved back from CT.                                                                 

14:45 CT Head Brain wo Cont In Process Unspecified.                                           EDMS

15:06 EKG done, by EKG tech. reviewed by Pierre Goetz MD.                                      [facility] 

16:19 No provider procedures requiring assistance completed. IV discontinued, intact,         tw2 

      bleeding controlled, No redness/swelling at site. Pressure dressing applied.                

                                                                                                  

Administered Medications:                                                                         

14:52 Drug: NS 0.9% 500 ml Route: IV; Rate: bolus; Site: left antecubital;                    tw2 

16:00 Follow up: Response: No adverse reaction; IV Status: Completed infusion; IV Intake:     tw2 

      500ml                                                                                       

                                                                                                  

                                                                                                  

Intake:                                                                                           

16:00 IV: 500ml; Total: 500ml.                                                                tw2 

                                                                                                  

Outcome:                                                                                          

15:59 Discharge ordered by MD.                                                                rn  

16:20 Discharged to home via wheelchair, with family.                                         tw2 

16:20 Condition: stable                                                                           

16:20 Discharge instructions given to patient, family, Instructed on discharge instructions,      

      follow up and referral plans. Demonstrated understanding of instructions, follow-up         

      care.                                                                                       

16:21 Patient left the ED.                                                                    tw2 

                                                                                                  

Signatures:                                                                                       

Dispatcher [employer]                           Marie Alfaro Roman, MD MD rn Wise, Tara, RN                          RN   2                                                  

Josefina Shetty                            [facility]                                                  

Lizbeth Austin                              3                                                  

Celestina Aj RN                        RN   ca1                                                  

                                                                                                  

**************************************************************************************************

## 2019-01-23 NOTE — EKG
Test Date:    2019-01-23               Test Time:    15:00:19

Technician:   LETTY/                                   

                                                     

MEASUREMENT RESULTS:                                       

Intervals:                                           

Rate:         58                                     

CA:           182                                    

QRSD:         100                                    

QT:           412                                    

QTc:          404                                    

Axis:                                                

P:            58                                     

CA:           182                                    

QRS:          -12                                    

T:            54                                     

                                                     

INTERPRETIVE STATEMENTS:                                       

                                                     

Sinus bradycardia

Minimal voltage criteria for LVH, may be normal variant

Borderline ECG

Compared to ECG 07/25/2018 17:27:59

Sinus rhythm no longer present



Electronically Signed On 01-23-19 19:45:28 CST by Shane Hurd

## 2019-01-23 NOTE — RAD REPORT
EXAM DESCRIPTION:  CT - Head Brain Wo Cont - 1/23/2019 2:42 pm

 

CLINICAL HISTORY:  Headache

 

COMPARISON:  January 2017

 

TECHNIQUE:  Computed axial tomography of the head was obtained. Unenhanced and enhanced images obtain
ed. 50 cc Isovue-300 administered intravenously.

 

All CT scans are performed using dose optimization technique as appropriate and may include automated
 exposure control or mA/KV adjustment according to patient size.

 

FINDINGS:  An intracranial  bleed is not seen .

 

The ventricles are normal in caliber.

 

No extra-axial fluid collection is noted.

 

No abnormal enhancement seen

 

Fluid within the sinuses/ mastoids is not seen.

 

IMPRESSION:  No acute intracranial abnormality is seen. If patient's symptoms persist  MRI of the bra
in would be recommended.